# Patient Record
Sex: MALE | Race: WHITE | NOT HISPANIC OR LATINO | Employment: FULL TIME | URBAN - NONMETROPOLITAN AREA
[De-identification: names, ages, dates, MRNs, and addresses within clinical notes are randomized per-mention and may not be internally consistent; named-entity substitution may affect disease eponyms.]

---

## 2023-03-13 DIAGNOSIS — F90.2 ADHD (ATTENTION DEFICIT HYPERACTIVITY DISORDER), COMBINED TYPE: Primary | ICD-10-CM

## 2023-03-13 RX ORDER — DEXTROAMPHETAMINE SACCHARATE, AMPHETAMINE ASPARTATE MONOHYDRATE, DEXTROAMPHETAMINE SULFATE AND AMPHETAMINE SULFATE 5; 5; 5; 5 MG/1; MG/1; MG/1; MG/1
20 CAPSULE, EXTENDED RELEASE ORAL EVERY MORNING
Qty: 30 CAPSULE | Refills: 0 | Status: SHIPPED | OUTPATIENT
Start: 2023-03-13 | End: 2023-04-19 | Stop reason: SDUPTHER

## 2023-03-13 RX ORDER — DEXTROAMPHETAMINE SACCHARATE, AMPHETAMINE ASPARTATE MONOHYDRATE, DEXTROAMPHETAMINE SULFATE AND AMPHETAMINE SULFATE 5; 5; 5; 5 MG/1; MG/1; MG/1; MG/1
20 CAPSULE, EXTENDED RELEASE ORAL EVERY MORNING
COMMUNITY
Start: 2022-08-08 | End: 2023-03-13 | Stop reason: SDUPTHER

## 2023-04-19 DIAGNOSIS — F90.2 ADHD (ATTENTION DEFICIT HYPERACTIVITY DISORDER), COMBINED TYPE: ICD-10-CM

## 2023-04-19 RX ORDER — DEXTROAMPHETAMINE SACCHARATE, AMPHETAMINE ASPARTATE MONOHYDRATE, DEXTROAMPHETAMINE SULFATE AND AMPHETAMINE SULFATE 5; 5; 5; 5 MG/1; MG/1; MG/1; MG/1
20 CAPSULE, EXTENDED RELEASE ORAL EVERY MORNING
Qty: 30 CAPSULE | Refills: 0 | Status: SHIPPED | OUTPATIENT
Start: 2023-04-19 | End: 2023-04-28 | Stop reason: SDUPTHER

## 2023-04-28 DIAGNOSIS — F90.2 ADHD (ATTENTION DEFICIT HYPERACTIVITY DISORDER), COMBINED TYPE: ICD-10-CM

## 2023-04-28 RX ORDER — DEXTROAMPHETAMINE SACCHARATE, AMPHETAMINE ASPARTATE MONOHYDRATE, DEXTROAMPHETAMINE SULFATE AND AMPHETAMINE SULFATE 5; 5; 5; 5 MG/1; MG/1; MG/1; MG/1
20 CAPSULE, EXTENDED RELEASE ORAL EVERY MORNING
Qty: 30 CAPSULE | Refills: 0 | Status: SHIPPED | OUTPATIENT
Start: 2023-04-28 | End: 2023-06-09 | Stop reason: SDUPTHER

## 2023-06-09 DIAGNOSIS — F90.2 ADHD (ATTENTION DEFICIT HYPERACTIVITY DISORDER), COMBINED TYPE: ICD-10-CM

## 2023-06-09 RX ORDER — DEXTROAMPHETAMINE SACCHARATE, AMPHETAMINE ASPARTATE MONOHYDRATE, DEXTROAMPHETAMINE SULFATE AND AMPHETAMINE SULFATE 5; 5; 5; 5 MG/1; MG/1; MG/1; MG/1
20 CAPSULE, EXTENDED RELEASE ORAL EVERY MORNING
Qty: 30 CAPSULE | Refills: 0 | Status: SHIPPED | OUTPATIENT
Start: 2023-06-09 | End: 2023-07-10 | Stop reason: SDUPTHER

## 2023-06-09 NOTE — PROGRESS NOTES
Subjective   Patient ID: Andrew Valencia is a 47 y.o. male who presents for No chief complaint on file..  HPI    Review of Systems    Objective   Physical Exam    Assessment/Plan

## 2023-07-10 DIAGNOSIS — F90.2 ADHD (ATTENTION DEFICIT HYPERACTIVITY DISORDER), COMBINED TYPE: ICD-10-CM

## 2023-07-10 RX ORDER — DEXTROAMPHETAMINE SACCHARATE, AMPHETAMINE ASPARTATE MONOHYDRATE, DEXTROAMPHETAMINE SULFATE AND AMPHETAMINE SULFATE 5; 5; 5; 5 MG/1; MG/1; MG/1; MG/1
20 CAPSULE, EXTENDED RELEASE ORAL EVERY MORNING
Qty: 30 CAPSULE | Refills: 0 | Status: SHIPPED | OUTPATIENT
Start: 2023-07-10 | End: 2023-08-03 | Stop reason: SDUPTHER

## 2023-07-18 DIAGNOSIS — M1A.00X0 IDIOPATHIC CHRONIC GOUT WITHOUT TOPHUS, UNSPECIFIED SITE: Primary | ICD-10-CM

## 2023-07-18 RX ORDER — ALLOPURINOL 300 MG/1
1 TABLET ORAL DAILY
COMMUNITY
Start: 2021-07-26 | End: 2023-07-18 | Stop reason: SDUPTHER

## 2023-07-18 RX ORDER — ALLOPURINOL 300 MG/1
300 TABLET ORAL DAILY
Qty: 90 TABLET | Refills: 3 | Status: SHIPPED | OUTPATIENT
Start: 2023-07-18 | End: 2024-07-17

## 2023-08-03 ENCOUNTER — OFFICE VISIT (OUTPATIENT)
Dept: PRIMARY CARE | Facility: CLINIC | Age: 48
End: 2023-08-03
Payer: COMMERCIAL

## 2023-08-03 VITALS
OXYGEN SATURATION: 97 % | BODY MASS INDEX: 38.62 KG/M2 | DIASTOLIC BLOOD PRESSURE: 80 MMHG | HEART RATE: 92 BPM | WEIGHT: 254 LBS | SYSTOLIC BLOOD PRESSURE: 134 MMHG

## 2023-08-03 DIAGNOSIS — E78.1 HYPERTRIGLYCERIDEMIA: ICD-10-CM

## 2023-08-03 DIAGNOSIS — M10.071 ACUTE IDIOPATHIC GOUT INVOLVING TOE OF RIGHT FOOT: ICD-10-CM

## 2023-08-03 DIAGNOSIS — Z51.81 ENCOUNTER FOR MONITORING STIMULANT THERAPY: ICD-10-CM

## 2023-08-03 DIAGNOSIS — Z00.00 WELL ADULT EXAM: Primary | ICD-10-CM

## 2023-08-03 DIAGNOSIS — Z79.899 ENCOUNTER FOR MONITORING STIMULANT THERAPY: ICD-10-CM

## 2023-08-03 DIAGNOSIS — F90.2 ADHD (ATTENTION DEFICIT HYPERACTIVITY DISORDER), COMBINED TYPE: ICD-10-CM

## 2023-08-03 PROBLEM — S83.241A ACUTE MEDIAL MENISCUS TEAR OF RIGHT KNEE: Status: RESOLVED | Noted: 2023-08-03 | Resolved: 2023-08-03

## 2023-08-03 PROBLEM — M25.571 ACUTE RIGHT ANKLE PAIN: Status: RESOLVED | Noted: 2023-08-03 | Resolved: 2023-08-03

## 2023-08-03 PROBLEM — F33.1 MAJOR DEPRESSIVE DISORDER, RECURRENT EPISODE, MODERATE WITH ANXIOUS DISTRESS (MULTI): Status: ACTIVE | Noted: 2023-08-03

## 2023-08-03 PROBLEM — R03.0 ELEVATED BLOOD PRESSURE, SITUATIONAL: Status: ACTIVE | Noted: 2023-08-03

## 2023-08-03 PROBLEM — K21.9 GASTROESOPHAGEAL REFLUX DISEASE: Status: ACTIVE | Noted: 2023-08-03

## 2023-08-03 PROBLEM — R25.2 SPASM: Status: RESOLVED | Noted: 2023-08-03 | Resolved: 2023-08-03

## 2023-08-03 PROBLEM — M23.91 INTERNAL DERANGEMENT OF RIGHT KNEE: Status: RESOLVED | Noted: 2023-08-03 | Resolved: 2023-08-03

## 2023-08-03 PROBLEM — S83.411A SPRAIN OF MEDIAL COLLATERAL LIGAMENT OF RIGHT KNEE: Status: RESOLVED | Noted: 2023-08-03 | Resolved: 2023-08-03

## 2023-08-03 PROBLEM — L89.221 PRESSURE INJURY OF LEFT HIP, STAGE 1: Status: RESOLVED | Noted: 2023-08-03 | Resolved: 2023-08-03

## 2023-08-03 PROBLEM — K64.8 INTERNAL HEMORRHOIDS: Status: ACTIVE | Noted: 2023-08-03

## 2023-08-03 PROBLEM — M10.9 ACUTE GOUT INVOLVING TOE OF RIGHT FOOT: Status: ACTIVE | Noted: 2023-08-03

## 2023-08-03 PROBLEM — M25.561 ACUTE PAIN OF RIGHT KNEE: Status: RESOLVED | Noted: 2023-08-03 | Resolved: 2023-08-03

## 2023-08-03 LAB
ALANINE AMINOTRANSFERASE (SGPT) (U/L) IN SER/PLAS: 29 U/L (ref 10–52)
ALBUMIN (G/DL) IN SER/PLAS: 4.6 G/DL (ref 3.4–5)
ALKALINE PHOSPHATASE (U/L) IN SER/PLAS: 47 U/L (ref 33–120)
AMPHETAMINE (PRESENCE) IN URINE BY SCREEN METHOD: ABNORMAL
ANION GAP IN SER/PLAS: 16 MMOL/L (ref 10–20)
ASPARTATE AMINOTRANSFERASE (SGOT) (U/L) IN SER/PLAS: 20 U/L (ref 9–39)
BARBITURATES PRESENCE IN URINE BY SCREEN METHOD: ABNORMAL
BENZODIAZEPINE (PRESENCE) IN URINE BY SCREEN METHOD: ABNORMAL
BILIRUBIN TOTAL (MG/DL) IN SER/PLAS: 0.4 MG/DL (ref 0–1.2)
CALCIUM (MG/DL) IN SER/PLAS: 9.6 MG/DL (ref 8.6–10.3)
CANNABINOIDS IN URINE BY SCREEN METHOD: ABNORMAL
CARBON DIOXIDE, TOTAL (MMOL/L) IN SER/PLAS: 29 MMOL/L (ref 21–32)
CHLORIDE (MMOL/L) IN SER/PLAS: 101 MMOL/L (ref 98–107)
CHOLESTEROL (MG/DL) IN SER/PLAS: 214 MG/DL (ref 0–199)
CHOLESTEROL IN HDL (MG/DL) IN SER/PLAS: 44.7 MG/DL
CHOLESTEROL/HDL RATIO: 4.8
COCAINE (PRESENCE) IN URINE BY SCREEN METHOD: ABNORMAL
CREATININE (MG/DL) IN SER/PLAS: 1.01 MG/DL (ref 0.5–1.3)
DRUG SCREEN COMMENT URINE: ABNORMAL
FENTANYL URINE: ABNORMAL
GFR MALE: >90 ML/MIN/1.73M2
GLUCOSE (MG/DL) IN SER/PLAS: 87 MG/DL (ref 74–99)
LDL: 139 MG/DL (ref 0–99)
METHADONE (PRESENCE) IN URINE BY SCREEN METHOD: ABNORMAL
OPIATES (PRESENCE) IN URINE BY SCREEN METHOD: ABNORMAL
OXYCODONE (PRESENCE) IN URINE BY SCREEN METHOD: ABNORMAL
PHENCYCLIDINE (PRESENCE) IN URINE BY SCREEN METHOD: ABNORMAL
POTASSIUM (MMOL/L) IN SER/PLAS: 4.7 MMOL/L (ref 3.5–5.3)
PROTEIN TOTAL: 7.2 G/DL (ref 6.4–8.2)
SODIUM (MMOL/L) IN SER/PLAS: 141 MMOL/L (ref 136–145)
TRIGLYCERIDE (MG/DL) IN SER/PLAS: 154 MG/DL (ref 0–149)
UREA NITROGEN (MG/DL) IN SER/PLAS: 9 MG/DL (ref 6–23)
VLDL: 31 MG/DL (ref 0–40)

## 2023-08-03 PROCEDURE — 80053 COMPREHEN METABOLIC PANEL: CPT

## 2023-08-03 PROCEDURE — 1036F TOBACCO NON-USER: CPT | Performed by: FAMILY MEDICINE

## 2023-08-03 PROCEDURE — 99396 PREV VISIT EST AGE 40-64: CPT | Performed by: FAMILY MEDICINE

## 2023-08-03 PROCEDURE — 3079F DIAST BP 80-89 MM HG: CPT | Performed by: FAMILY MEDICINE

## 2023-08-03 PROCEDURE — 80307 DRUG TEST PRSMV CHEM ANLYZR: CPT

## 2023-08-03 PROCEDURE — 3075F SYST BP GE 130 - 139MM HG: CPT | Performed by: FAMILY MEDICINE

## 2023-08-03 PROCEDURE — 80324 DRUG SCREEN AMPHETAMINES 1/2: CPT

## 2023-08-03 PROCEDURE — 80061 LIPID PANEL: CPT

## 2023-08-03 RX ORDER — METHOCARBAMOL 500 MG/1
500 TABLET, FILM COATED ORAL 4 TIMES DAILY
COMMUNITY
End: 2024-06-08 | Stop reason: ALTCHOICE

## 2023-08-03 RX ORDER — OMEPRAZOLE 40 MG/1
40 CAPSULE, DELAYED RELEASE ORAL DAILY
COMMUNITY
End: 2023-08-18

## 2023-08-03 RX ORDER — DEXTROAMPHETAMINE SACCHARATE, AMPHETAMINE ASPARTATE MONOHYDRATE, DEXTROAMPHETAMINE SULFATE AND AMPHETAMINE SULFATE 5; 5; 5; 5 MG/1; MG/1; MG/1; MG/1
20 CAPSULE, EXTENDED RELEASE ORAL EVERY MORNING
Qty: 30 CAPSULE | Refills: 0 | Status: SHIPPED | OUTPATIENT
Start: 2023-08-03 | End: 2023-09-18 | Stop reason: SDUPTHER

## 2023-08-03 ASSESSMENT — ENCOUNTER SYMPTOMS
BLOOD IN STOOL: 0
BRUISES/BLEEDS EASILY: 0
DIARRHEA: 0
COUGH: 0
FATIGUE: 0
WEAKNESS: 0
HEMATURIA: 0
POLYDIPSIA: 0
FEVER: 0
PALPITATIONS: 0
TROUBLE SWALLOWING: 0
EYE PAIN: 0
BACK PAIN: 0
WHEEZING: 0
CONSTIPATION: 0
DIZZINESS: 0
DYSURIA: 0
ABDOMINAL PAIN: 0
CHILLS: 0
COLOR CHANGE: 0
JOINT SWELLING: 1
ADENOPATHY: 0
HEADACHES: 0
SORE THROAT: 0
ARTHRALGIAS: 1
NUMBNESS: 0
FREQUENCY: 0
SHORTNESS OF BREATH: 0
NAUSEA: 0
CHEST TIGHTNESS: 0
NERVOUS/ANXIOUS: 0
TREMORS: 0
EYE REDNESS: 0
VOMITING: 0
POLYPHAGIA: 0
DYSPHORIC MOOD: 0

## 2023-08-03 NOTE — PATIENT INSTRUCTIONS
It is important to wear your sun block when you are going to spend more then a minute or two in the sun to reduce your risk of skin cancer    If you are a woman, then you should be doing a self breast exam once a month to feel for any lumps or bumps that do not resolve during the month. Call if you find this.    If you are a man, then you should be doing a self testicular exam once a month to feel for any lumps or bumps. Call if you find this.    You should get on average 150 minutes of cardiovascular exercise (such as brisk walking, running, biking, swimming, etc.) a week.  You should also limit food high in sodium, sugar and saturated/trans fats.  Eating lots of fruits and vegetables is good at helping lower cholesterol and blood pressure if prepared correctly    The age for colonoscopy is age 45-75 for average risk individuals    Prostate screen starts at age 50 and breast cancer screening is at age 40    Woman should get a pap smear between the ages of 21 and 65. The frequency depends on your age, the type of pap you had last and the result of the last pap.    Alcohol should be limited to 1 a day for women and 2 a day for men.    No amount of tobacco in any form is safe. It is recommended that no tobacco be used in any form.  Vapes are also not safe as there are multiple harmful chemicals in them and the heat can directly damage lung tissue.

## 2023-08-03 NOTE — PROGRESS NOTES
Subjective   Patient ID: Andrew Valencia is a 47 y.o. male who presents for Annual Exam (Yearly check up, also had an issue with his left hand being a little swollen an stiff.).  HPI  Some swelling and stiffness in left MCP joint last week weeks  Started when was in [place]  Was off allopurinol and just restarted it- going away now    No CP, SOB, palpitations, numbness, weakness, dizziness, HA, vision changes    Concentration is good    OAR[facility]:  Jeff Moyer, DO on 8/3/2023  9:29 AM  I have personally reviewed the OARRS report for Andrew Valencia. I have considered the risks of abuse, dependence, addiction and diversion    Is the patient prescribed a combination of a benzodiazepine and opioid?  No    Last Urine Drug Screen / ordered today: Yes  Recent Results (from the past 09256 hour(s))   Drug Screen, Urine With Reflex to Confirmation    Collection Time: 08/03/23  9:42 AM   Result Value Ref Range    DRUG SCREEN COMMENT URINE SEE BELOW     Amphetamine Screen, Urine PRESUMPTIVE POSITIVE (A) NEGATIVE    Barbiturate Screen, Urine PRESUMPTIVE NEGATIVE NEGATIVE    BENZODIAZEPINE (PRESENCE) IN URINE BY SCREEN METHOD PRESUMPTIVE NEGATIVE NEGATIVE    Cannabinoid Screen, Urine PRESUMPTIVE NEGATIVE NEGATIVE    Cocaine Screen, Urine PRESUMPTIVE NEGATIVE NEGATIVE    Fentanyl, Ur PRESUMPTIVE NEGATIVE NEGATIVE    Methadone Screen, Urine PRESUMPTIVE NEGATIVE NEGATIVE    Opiate Screen, Urine PRESUMPTIVE NEGATIVE NEGATIVE    Oxycodone Screen, Ur PRESUMPTIVE NEGATIVE NEGATIVE    PCP Screen, Urine PRESUMPTIVE NEGATIVE NEGATIVE   Amphetamine Confirm, Urine    Collection Time: 08/08/22  2:46 PM   Result Value Ref Range    Amphetamines,Urine 751 ng/mL    MDA, Urine <200 ng/mL    MDEA, Urine <200 ng/mL    MDMA, Urine <200 ng/mL    Methamphetamine Quant, Ur <200 ng/mL    Phentermine,Urine <200 ng/mL   OPIATE/OPIOID/BENZO PRESCRIPTION COMPLIANCE    Collection Time: 08/08/22  2:46 PM   Result Value Ref Range    DRUG SCREEN  COMMENT URINE SEE BELOW     Creatine, Urine 116.0 mg/dL    Amphetamine Screen, Urine PRESUMPTIVE POSITIVE (A) NEGATIVE    Barbiturate Screen, Urine PRESUMPTIVE NEGATIVE NEGATIVE    Cannabinoid Screen, Urine PRESUMPTIVE NEGATIVE NEGATIVE    Cocaine Screen, Urine PRESUMPTIVE NEGATIVE NEGATIVE    PCP Screen, Urine PRESUMPTIVE NEGATIVE NEGATIVE    7-Aminoclonazepam <25 Cutoff <25 ng/mL    Alpha-Hydroxyalprazolam <25 Cutoff <25 ng/mL    Alpha-Hydroxymidazolam <25 Cutoff <25 ng/mL    Alprazolam <25 Cutoff <25 ng/mL    Chlordiazepoxide <25 Cutoff <25 ng/mL    Clonazepam <25 Cutoff <25 ng/mL    Diazepam <25 Cutoff <25 ng/mL    Lorazepam <25 Cutoff <25 ng/mL    Midazolam <25 Cutoff <25 ng/mL    Nordiazepam <25 Cutoff <25 ng/mL    Oxazepam <25 Cutoff <25 ng/mL    Temazepam <25 Cutoff <25 ng/mL    Zolpidem <25 Cutoff <25 ng/mL    Zolpidem Metabolite (ZCA) <25 Cutoff <25 ng/mL    6-Acetylmorphine <25 Cutoff <25 ng/mL    Codeine <50 Cutoff <50 ng/mL    Hydrocodone <25 Cutoff <25 ng/mL    Hydromorphone <25 Cutoff <25 ng/mL    Morphine Urine <50 Cutoff <50 ng/mL    Norhydrocodone <25 Cutoff <25 ng/mL    Noroxycodone <25 Cutoff <25 ng/mL    Oxycodone <25 Cutoff <25 ng/mL    Oxymorphone <25 Cutoff <25 ng/mL    Tramadol <50 Cutoff <50 ng/mL    O-Desmethyltramadol <50 Cutoff <50 ng/mL    Fentanyl <2.5 Cutoff<2.5 ng/mL    Norfentanyl <2.5 Cutoff<2.5 ng/mL    METHADONE CONFIRMATION,URINE <25 Cutoff <25 ng/mL    EDDP <25 Cutoff <25 ng/mL     Results are as expected.     Controlled Substance Agreement:  Date of the Last Agreement: 8/3/23  Reviewed Controlled Substance Agreement including but not limited to the benefits, risks, and alternatives to treatment with a Controlled Substance medication(s).    Stimulants:   What is the patient's goal of therapy? Focus at work  Is this being achieved with current treatment? Yes    Activities of Daily Living:   Is your overall impression that this patient is benefiting (symptom reduction outweighs  side effects) from stimulant therapy? Yes     1. Physical Functioning: Same  2. Family Relationship: Better  3. Social Relationship: Better  4. Mood: Better  5. Sleep Patterns: Better  6. Overall Function: Better        Current Outpatient Medications:     allopurinol (Zyloprim) 300 mg tablet, Take 1 tablet (300 mg) by mouth once daily., Disp: 90 tablet, Rfl: 3    amphetamine-dextroamphetamine XR (Adderall XR) 20 mg 24 hr capsule, Take 1 capsule (20 mg) by mouth once daily in the morning., Disp: 30 capsule, Rfl: 0    methocarbamol (Robaxin) 500 mg tablet, Take 1 tablet (500 mg) by mouth 4 times a day., Disp: , Rfl:     omeprazole (PriLOSEC) 40 mg DR capsule, Take 1 capsule (40 mg) by mouth once daily., Disp: , Rfl:    Past Surgical History:   Procedure Laterality Date    APPENDECTOMY  09/06/2017    Appendectomy      Past Medical History:   Diagnosis Date    Acute medial meniscus tear of right knee 08/03/2023    Acute pain of right knee 08/03/2023    Acute right ankle pain 08/03/2023    Cellulitis of left lower limb 11/16/2019    Cellulitis of hip, left    Cramp and spasm 09/06/2017    Hand or foot spasms    Internal derangement of right knee 08/03/2023    Other acute sinusitis 09/28/2018    Other acute sinusitis, recurrence not specified    Personal history of other diseases of the respiratory system 09/29/2018    History of acute sinusitis    Personal history of other specified conditions 03/13/2019    History of fatigue    Personal history of other specified conditions 01/25/2021    History of dyspnea    Rash and other nonspecific skin eruption 01/25/2021    Rash    Strain of muscle and tendon of unspecified wall of thorax, initial encounter 03/24/2018    Strain of thoracic region, initial encounter     Social History     Tobacco Use    Smoking status: Former     Types: Cigarettes    Smokeless tobacco: Never   Vaping Use    Vaping Use: Never used   Substance Use Topics    Alcohol use: Yes    Drug use: Never      No  family history on file.   Review of Systems   Constitutional:  Negative for chills, fatigue and fever.   HENT:  Negative for congestion, ear discharge, ear pain, hearing loss, nosebleeds, sore throat, tinnitus and trouble swallowing.    Eyes:  Negative for pain, redness and visual disturbance.   Respiratory:  Negative for cough, chest tightness, shortness of breath and wheezing.    Cardiovascular:  Negative for chest pain, palpitations and leg swelling.   Gastrointestinal:  Negative for abdominal pain, blood in stool, constipation, diarrhea, nausea and vomiting.   Endocrine: Negative for cold intolerance, heat intolerance, polydipsia, polyphagia and polyuria.   Genitourinary:  Negative for dysuria, frequency, hematuria and urgency.   Musculoskeletal:  Positive for arthralgias and joint swelling. Negative for back pain and gait problem.   Skin:  Negative for color change and rash.   Neurological:  Negative for dizziness, tremors, syncope, weakness, numbness and headaches.   Hematological:  Negative for adenopathy. Does not bruise/bleed easily.   Psychiatric/Behavioral:  Negative for dysphoric mood. The patient is not nervous/anxious.        Objective   /80 (BP Location: Right arm, Patient Position: Sitting, BP Cuff Size: Large adult)   Pulse 92   Wt 115 kg (254 lb)   SpO2 97%   BMI 38.62 kg/m²    Physical Exam  Vitals and nursing note reviewed.   Constitutional:       General: He is not in acute distress.     Appearance: Normal appearance.   HENT:      Head: Normocephalic and atraumatic.      Right Ear: Tympanic membrane, ear canal and external ear normal.      Left Ear: Tympanic membrane, ear canal and external ear normal.      Nose: Nose normal.      Mouth/Throat:      Mouth: Mucous membranes are moist.      Pharynx: Oropharynx is clear.   Eyes:      Extraocular Movements: Extraocular movements intact.      Conjunctiva/sclera: Conjunctivae normal.      Pupils: Pupils are equal, round, and reactive to  light.   Neck:      Vascular: No carotid bruit.   Cardiovascular:      Rate and Rhythm: Normal rate and regular rhythm.      Pulses: Normal pulses.      Heart sounds: Normal heart sounds. No murmur heard.  Pulmonary:      Effort: Pulmonary effort is normal.      Breath sounds: Normal breath sounds.   Abdominal:      General: Abdomen is flat. Bowel sounds are normal.      Palpations: Abdomen is soft. There is no mass.   Musculoskeletal:         General: Normal range of motion.      Cervical back: Normal range of motion and neck supple.   Lymphadenopathy:      Cervical: No cervical adenopathy.   Skin:     Capillary Refill: Capillary refill takes less than 2 seconds.   Neurological:      General: No focal deficit present.      Mental Status: He is alert and oriented to person, place, and time.      Cranial Nerves: No cranial nerve deficit.      Motor: No weakness.      Deep Tendon Reflexes: Reflexes normal.   Psychiatric:         Mood and Affect: Mood normal.         Behavior: Behavior normal.         Assessment/Plan   Problem List Items Addressed This Visit       ADHD (attention deficit hyperactivity disorder), combined type    Relevant Medications    amphetamine-dextroamphetamine XR (Adderall XR) 20 mg 24 hr capsule    Hypertriglyceridemia    Relevant Orders    Comprehensive Metabolic Panel (Completed)    Lipid Panel (Completed)    Acute gout involving toe of right foot     Other Visit Diagnoses       Well adult exam    -  Primary    Encounter for monitoring stimulant therapy        Relevant Orders    Drug Screen, Urine With Reflex to Confirmation (Completed)        ADHD- adderall, UDS    Gout- allopurinol, avoid purines    Hyperlipidemia- TLC, check labs    GERD- omeprazole, avoid trigger foods    Patient understands and agrees with treatment plan    Jeff Moyer DO

## 2023-08-07 LAB
AMPHETAMINES,URINE: 2348 NG/ML
MDA,URINE: <200 NG/ML
MDEA,URINE: <200 NG/ML
MDMA,UR: <200 NG/ML
METHAMPHETAMINE QUANTITATIVE URINE: <200 NG/ML
PHENTERMINE,UR: <200 NG/ML

## 2023-08-11 DIAGNOSIS — M10.071 ACUTE IDIOPATHIC GOUT INVOLVING TOE OF RIGHT FOOT: Primary | ICD-10-CM

## 2023-08-11 RX ORDER — COLCHICINE 0.6 MG/1
TABLET ORAL
Qty: 9 TABLET | Refills: 1 | Status: SHIPPED | OUTPATIENT
Start: 2023-08-11 | End: 2023-10-05 | Stop reason: ALTCHOICE

## 2023-08-18 DIAGNOSIS — K21.9 GASTROESOPHAGEAL REFLUX DISEASE WITHOUT ESOPHAGITIS: Primary | ICD-10-CM

## 2023-08-18 RX ORDER — OMEPRAZOLE 40 MG/1
40 CAPSULE, DELAYED RELEASE ORAL DAILY
Qty: 90 CAPSULE | Refills: 0 | Status: SHIPPED | OUTPATIENT
Start: 2023-08-18 | End: 2024-05-15

## 2023-08-28 ENCOUNTER — TELEMEDICINE (OUTPATIENT)
Dept: PRIMARY CARE | Facility: CLINIC | Age: 48
End: 2023-08-28
Payer: COMMERCIAL

## 2023-08-28 DIAGNOSIS — M10.071 ACUTE IDIOPATHIC GOUT INVOLVING TOE OF RIGHT FOOT: Primary | ICD-10-CM

## 2023-08-28 PROCEDURE — 99441 PR PHYS/QHP TELEPHONE EVALUATION 5-10 MIN: CPT | Performed by: FAMILY MEDICINE

## 2023-08-28 RX ORDER — PREDNISONE 20 MG/1
40 TABLET ORAL DAILY
Qty: 10 TABLET | Refills: 1 | Status: SHIPPED | OUTPATIENT
Start: 2023-08-28 | End: 2023-09-07 | Stop reason: WASHOUT

## 2023-08-28 NOTE — PROGRESS NOTES
Subjective   Patient ID: Andrew Valencia is a 47 y.o. male who presents for No chief complaint on file..  HPI    Current Outpatient Medications:     allopurinol (Zyloprim) 300 mg tablet, Take 1 tablet (300 mg) by mouth once daily., Disp: 90 tablet, Rfl: 3    amphetamine-dextroamphetamine XR (Adderall XR) 20 mg 24 hr capsule, Take 1 capsule (20 mg) by mouth once daily in the morning., Disp: 30 capsule, Rfl: 0    colchicine 0.6 mg tablet, Take 2 pills now and then 1 pill 12 hours later, Disp: 9 tablet, Rfl: 1    methocarbamol (Robaxin) 500 mg tablet, Take 1 tablet (500 mg) by mouth 4 times a day., Disp: , Rfl:     omeprazole (PriLOSEC) 40 mg DR capsule, TAKE ONE CAPSULE BY MOUTH EVERY DAY, Disp: 90 capsule, Rfl: 0   Past Surgical History:   Procedure Laterality Date    APPENDECTOMY  09/06/2017    Appendectomy      Past Medical History:   Diagnosis Date    Acute medial meniscus tear of right knee 08/03/2023    Acute pain of right knee 08/03/2023    Acute right ankle pain 08/03/2023    Cellulitis of left lower limb 11/16/2019    Cellulitis of hip, left    Cramp and spasm 09/06/2017    Hand or foot spasms    Internal derangement of right knee 08/03/2023    Other acute sinusitis 09/28/2018    Other acute sinusitis, recurrence not specified    Personal history of other diseases of the respiratory system 09/29/2018    History of acute sinusitis    Personal history of other specified conditions 03/13/2019    History of fatigue    Personal history of other specified conditions 01/25/2021    History of dyspnea    Rash and other nonspecific skin eruption 01/25/2021    Rash    Strain of muscle and tendon of unspecified wall of thorax, initial encounter 03/24/2018    Strain of thoracic region, initial encounter     Social History     Tobacco Use    Smoking status: Former     Types: Cigarettes    Smokeless tobacco: Never   Vaping Use    Vaping Use: Never used   Substance Use Topics    Alcohol use: Yes    Drug use: Never      No  family history on file.   Review of Systems    Objective   There were no vitals taken for this visit.   Physical Exam    Assessment/Plan   Problem List Items Addressed This Visit    None      Patient understands and agrees with treatment plan    Jeff Moyer, DO

## 2023-08-29 NOTE — PROGRESS NOTES
Subjective   Patient ID: Andrew Valencia is a 47 y.o. male who presents for Gout.  HPI  Still having a lot of gout pain even though took colchicine and is on allopurinol  Diffuse joint pain  No fever, chills  Minimal redness  Says feels like his gout pain  No help with topical rubs    Current Outpatient Medications:     allopurinol (Zyloprim) 300 mg tablet, Take 1 tablet (300 mg) by mouth once daily., Disp: 90 tablet, Rfl: 3    amphetamine-dextroamphetamine XR (Adderall XR) 20 mg 24 hr capsule, Take 1 capsule (20 mg) by mouth once daily in the morning., Disp: 30 capsule, Rfl: 0    colchicine 0.6 mg tablet, Take 2 pills now and then 1 pill 12 hours later, Disp: 9 tablet, Rfl: 1    methocarbamol (Robaxin) 500 mg tablet, Take 1 tablet (500 mg) by mouth 4 times a day., Disp: , Rfl:     omeprazole (PriLOSEC) 40 mg DR capsule, TAKE ONE CAPSULE BY MOUTH EVERY DAY, Disp: 90 capsule, Rfl: 0    predniSONE (Deltasone) 20 mg tablet, Take 2 tablets (40 mg) by mouth once daily for 10 days., Disp: 10 tablet, Rfl: 1   Past Surgical History:   Procedure Laterality Date    APPENDECTOMY  09/06/2017    Appendectomy      Past Medical History:   Diagnosis Date    Acute medial meniscus tear of right knee 08/03/2023    Acute pain of right knee 08/03/2023    Acute right ankle pain 08/03/2023    Cellulitis of left lower limb 11/16/2019    Cellulitis of hip, left    Cramp and spasm 09/06/2017    Hand or foot spasms    Internal derangement of right knee 08/03/2023    Other acute sinusitis 09/28/2018    Other acute sinusitis, recurrence not specified    Personal history of other diseases of the respiratory system 09/29/2018    History of acute sinusitis    Personal history of other specified conditions 03/13/2019    History of fatigue    Personal history of other specified conditions 01/25/2021    History of dyspnea    Rash and other nonspecific skin eruption 01/25/2021    Rash    Strain of muscle and tendon of unspecified wall of thorax,  "initial encounter 03/24/2018    Strain of thoracic region, initial encounter     Social History     Tobacco Use    Smoking status: Former     Types: Cigarettes    Smokeless tobacco: Never   Vaping Use    Vaping Use: Never used   Substance Use Topics    Alcohol use: Yes    Drug use: Never      No family history on file.   Review of Systems    Objective   There were no vitals taken for this visit.   Physical Exam    Assessment/Plan   Problem List Items Addressed This Visit       Acute gout involving toe of right foot - Primary   Prednisone 40 mg qday for 5 days  Avoid purines, fluids    I discussed with the patient the potential benefits and risks of the use of telephone or video-conferencing that differ from in-person services (e.g., limits to patient confidentiality, limitations on the provider’s ability to observe the patient, limitations on the diagnostic tools available). I explained to the patient that I may determine at any point that telehealth services are not appropriate based on the patient’s circumstances and either party may therefore end the service to schedule an alternative in-person service or contact 911 to address a medical emergency. With the understanding of these risks, benefits and alternatives, the patient agreed to use the telephone or video-conferencing platform selected for this virtual session and further the patient confirmed his/her understanding that the services do not guarantee a specific outcome or recovery.  \"Spent 5 minutes with patient on phone discussing health concerns.\"        Patient understands and agrees with treatment plan    Jeff Moyer, DO        "

## 2023-09-18 DIAGNOSIS — F90.2 ADHD (ATTENTION DEFICIT HYPERACTIVITY DISORDER), COMBINED TYPE: ICD-10-CM

## 2023-09-18 RX ORDER — DEXTROAMPHETAMINE SACCHARATE, AMPHETAMINE ASPARTATE MONOHYDRATE, DEXTROAMPHETAMINE SULFATE AND AMPHETAMINE SULFATE 5; 5; 5; 5 MG/1; MG/1; MG/1; MG/1
20 CAPSULE, EXTENDED RELEASE ORAL EVERY MORNING
Qty: 30 CAPSULE | Refills: 0 | Status: SHIPPED | OUTPATIENT
Start: 2023-09-18 | End: 2023-11-09 | Stop reason: SDUPTHER

## 2023-10-05 ENCOUNTER — OFFICE VISIT (OUTPATIENT)
Dept: PRIMARY CARE | Facility: CLINIC | Age: 48
End: 2023-10-05
Payer: COMMERCIAL

## 2023-10-05 VITALS
DIASTOLIC BLOOD PRESSURE: 88 MMHG | HEART RATE: 101 BPM | BODY MASS INDEX: 34.92 KG/M2 | OXYGEN SATURATION: 96 % | WEIGHT: 257.8 LBS | HEIGHT: 72 IN | SYSTOLIC BLOOD PRESSURE: 138 MMHG

## 2023-10-05 DIAGNOSIS — M25.50 POLYARTHRALGIA: Primary | ICD-10-CM

## 2023-10-05 LAB
CRP SERPL-MCNC: 0.57 MG/DL
ERYTHROCYTE [SEDIMENTATION RATE] IN BLOOD BY WESTERGREN METHOD: 23 MM/H (ref 0–15)
RHEUMATOID FACT SER NEPH-ACNC: 61 IU/ML (ref 0–15)

## 2023-10-05 PROCEDURE — 99213 OFFICE O/P EST LOW 20 MIN: CPT | Performed by: FAMILY MEDICINE

## 2023-10-05 PROCEDURE — 86225 DNA ANTIBODY NATIVE: CPT

## 2023-10-05 PROCEDURE — 36415 COLL VENOUS BLD VENIPUNCTURE: CPT

## 2023-10-05 PROCEDURE — 86200 CCP ANTIBODY: CPT

## 2023-10-05 PROCEDURE — 3075F SYST BP GE 130 - 139MM HG: CPT | Performed by: FAMILY MEDICINE

## 2023-10-05 PROCEDURE — 86235 NUCLEAR ANTIGEN ANTIBODY: CPT

## 2023-10-05 PROCEDURE — 86140 C-REACTIVE PROTEIN: CPT

## 2023-10-05 PROCEDURE — 86431 RHEUMATOID FACTOR QUANT: CPT

## 2023-10-05 PROCEDURE — 3079F DIAST BP 80-89 MM HG: CPT | Performed by: FAMILY MEDICINE

## 2023-10-05 PROCEDURE — 86038 ANTINUCLEAR ANTIBODIES: CPT

## 2023-10-05 PROCEDURE — 85652 RBC SED RATE AUTOMATED: CPT

## 2023-10-05 PROCEDURE — 1036F TOBACCO NON-USER: CPT | Performed by: FAMILY MEDICINE

## 2023-10-05 RX ORDER — HYDROXYCHLOROQUINE SULFATE 200 MG/1
200 TABLET, FILM COATED ORAL DAILY
Qty: 30 TABLET | Refills: 1 | Status: SHIPPED | OUTPATIENT
Start: 2023-10-05 | End: 2023-10-17 | Stop reason: SDUPTHER

## 2023-10-05 ASSESSMENT — PATIENT HEALTH QUESTIONNAIRE - PHQ9
SUM OF ALL RESPONSES TO PHQ9 QUESTIONS 1 AND 2: 0
1. LITTLE INTEREST OR PLEASURE IN DOING THINGS: NOT AT ALL
2. FEELING DOWN, DEPRESSED OR HOPELESS: NOT AT ALL

## 2023-10-05 NOTE — PROGRESS NOTES
Subjective   Patient ID: Andrew Valencia is a 47 y.o. male who presents for Joint Swelling (Pain as well).  HPI  Pain in hands, wrists and knees  Swelling in hands  Prednisone makes it go away and then comes back  Taking 4-6 aleves a day  + stiffness, soreness  No redness in joints but will get more warm  ? Some psoriasis patches  No fever, chills  No locking up or giving out    Current Outpatient Medications:     allopurinol (Zyloprim) 300 mg tablet, Take 1 tablet (300 mg) by mouth once daily., Disp: 90 tablet, Rfl: 3    amphetamine-dextroamphetamine XR (Adderall XR) 20 mg 24 hr capsule, Take 1 capsule (20 mg) by mouth once daily in the morning., Disp: 30 capsule, Rfl: 0    omeprazole (PriLOSEC) 40 mg DR capsule, TAKE ONE CAPSULE BY MOUTH EVERY DAY, Disp: 90 capsule, Rfl: 0    hydroxychloroquine (Plaquenil) 200 mg tablet, Take 1 tablet (200 mg) by mouth once daily., Disp: 30 tablet, Rfl: 1    methocarbamol (Robaxin) 500 mg tablet, Take 1 tablet (500 mg) by mouth 4 times a day., Disp: , Rfl:    Past Surgical History:   Procedure Laterality Date    APPENDECTOMY  09/06/2017    Appendectomy      Past Medical History:   Diagnosis Date    Acute medial meniscus tear of right knee 08/03/2023    Acute pain of right knee 08/03/2023    Acute right ankle pain 08/03/2023    Cellulitis of left lower limb 11/16/2019    Cellulitis of hip, left    Cramp and spasm 09/06/2017    Hand or foot spasms    Internal derangement of right knee 08/03/2023    Other acute sinusitis 09/28/2018    Other acute sinusitis, recurrence not specified    Personal history of other diseases of the respiratory system 09/29/2018    History of acute sinusitis    Personal history of other specified conditions 03/13/2019    History of fatigue    Personal history of other specified conditions 01/25/2021    History of dyspnea    Rash and other nonspecific skin eruption 01/25/2021    Rash    Strain of muscle and tendon of unspecified wall of thorax, initial  encounter 03/24/2018    Strain of thoracic region, initial encounter     Social History     Tobacco Use    Smoking status: Former     Types: Cigarettes    Smokeless tobacco: Never   Vaping Use    Vaping Use: Never used   Substance Use Topics    Alcohol use: Yes    Drug use: Never      No family history on file.   Review of Systems    Objective   /88 (BP Location: Right arm, Patient Position: Sitting, BP Cuff Size: Large adult)   Pulse 101   Ht 1.829 m (6')   Wt 117 kg (257 lb 12.8 oz)   SpO2 96%   BMI 34.96 kg/m²    Physical Exam  Vitals and nursing note reviewed.   Constitutional:       Appearance: Normal appearance.   HENT:      Head: Normocephalic and atraumatic.   Cardiovascular:      Rate and Rhythm: Normal rate and regular rhythm.      Pulses: Normal pulses.      Heart sounds: Normal heart sounds.   Pulmonary:      Effort: Pulmonary effort is normal.      Breath sounds: Normal breath sounds.   Musculoskeletal:      Cervical back: Normal range of motion and neck supple.      Comments: Slight swelling and TTP in MCP and PIP joints of hands   Skin:     Capillary Refill: Capillary refill takes less than 2 seconds.   Neurological:      General: No focal deficit present.      Mental Status: He is alert and oriented to person, place, and time.      Sensory: No sensory deficit.      Motor: No weakness.   Psychiatric:         Mood and Affect: Mood is not anxious.         Behavior: Behavior normal.       Assessment/Plan   Problem List Items Addressed This Visit    None  Visit Diagnoses       Polyarthralgia    -  Primary    Relevant Medications    hydroxychloroquine (Plaquenil) 200 mg tablet    Other Relevant Orders    [facility] with Reflex to [facility]    Rheumatoid Factor (Completed)    Sedimentation Rate (Completed)    C-Reactive Protein (Completed)    Citrulline Antibody, IgG        Suspect Psoriatic Arthritis- plaquenil (follow with eye doctor), NSAID prn, checked labs    Patient understands and agrees with treatment  plan    Jeff Moyer, DO

## 2023-10-06 LAB
ANA PATTERN: ABNORMAL
ANA SER QL HEP2 SUBST: POSITIVE
ANA TITR SER IF: ABNORMAL {TITER}
CCP IGG SERPL-ACNC: >300 U/ML
CENTROMERE B AB SER-ACNC: <0.2 AI
CHROMATIN AB SERPL-ACNC: <0.2 AI
DSDNA AB SER-ACNC: 1 IU/ML
ENA JO1 AB SER QL IA: <0.2 AI
ENA RNP AB SER IA-ACNC: 0.9 AI
ENA SCL70 AB SER QL IA: <0.2 AI
ENA SM AB SER IA-ACNC: <0.2 AI
ENA SM+RNP AB SER QL IA: <0.2 AI
ENA SS-A AB SER IA-ACNC: <0.2 AI
ENA SS-B AB SER IA-ACNC: <0.2 AI
RIBOSOMAL P AB SER-ACNC: <0.2 AI

## 2023-10-09 DIAGNOSIS — M19.90 INFLAMMATORY ARTHRITIS: Primary | ICD-10-CM

## 2023-10-15 PROBLEM — Z79.899 ON STIMULANT MEDICATION: Status: ACTIVE | Noted: 2023-10-15

## 2023-10-15 PROBLEM — E66.01 CLASS 2 SEVERE OBESITY DUE TO EXCESS CALORIES WITH SERIOUS COMORBIDITY AND BODY MASS INDEX (BMI) OF 38.0 TO 38.9 IN ADULT (MULTI): Status: ACTIVE | Noted: 2023-10-15

## 2023-10-15 PROBLEM — E66.812 CLASS 2 SEVERE OBESITY DUE TO EXCESS CALORIES WITH SERIOUS COMORBIDITY AND BODY MASS INDEX (BMI) OF 38.0 TO 38.9 IN ADULT: Status: ACTIVE | Noted: 2023-10-15

## 2023-10-15 RX ORDER — DEXTROAMPHETAMINE SACCHARATE, AMPHETAMINE ASPARTATE MONOHYDRATE, DEXTROAMPHETAMINE SULFATE AND AMPHETAMINE SULFATE 3.75; 3.75; 3.75; 3.75 MG/1; MG/1; MG/1; MG/1
15 CAPSULE, EXTENDED RELEASE ORAL EVERY MORNING
COMMUNITY
End: 2023-11-09 | Stop reason: SDUPTHER

## 2023-10-15 RX ORDER — COLCHICINE 0.6 MG/1
0.6 TABLET ORAL DAILY
COMMUNITY

## 2023-10-17 ENCOUNTER — OFFICE VISIT (OUTPATIENT)
Dept: RHEUMATOLOGY | Facility: CLINIC | Age: 48
End: 2023-10-17
Payer: COMMERCIAL

## 2023-10-17 ENCOUNTER — LAB (OUTPATIENT)
Dept: LAB | Facility: LAB | Age: 48
End: 2023-10-17
Payer: COMMERCIAL

## 2023-10-17 VITALS — DIASTOLIC BLOOD PRESSURE: 60 MMHG | BODY MASS INDEX: 34.72 KG/M2 | SYSTOLIC BLOOD PRESSURE: 138 MMHG | WEIGHT: 256 LBS

## 2023-10-17 DIAGNOSIS — M05.9 SEROPOSITIVE RHEUMATOID ARTHRITIS (MULTI): Primary | ICD-10-CM

## 2023-10-17 DIAGNOSIS — M1A.0790 IDIOPATHIC CHRONIC GOUT OF FOOT WITHOUT TOPHUS, UNSPECIFIED LATERALITY: ICD-10-CM

## 2023-10-17 DIAGNOSIS — R76.8 ANA POSITIVE: ICD-10-CM

## 2023-10-17 DIAGNOSIS — M05.9 SEROPOSITIVE RHEUMATOID ARTHRITIS (MULTI): ICD-10-CM

## 2023-10-17 DIAGNOSIS — M25.50 POLYARTHRALGIA: ICD-10-CM

## 2023-10-17 LAB
C3 SERPL-MCNC: 161 MG/DL (ref 87–200)
C4 SERPL-MCNC: 24 MG/DL (ref 10–50)
HBV CORE AB SER QL: NONREACTIVE
HBV SURFACE AG SERPL QL IA: NONREACTIVE
HCV AB SER QL: NONREACTIVE
URATE SERPL-MCNC: 4.2 MG/DL (ref 4–7.5)

## 2023-10-17 PROCEDURE — 36415 COLL VENOUS BLD VENIPUNCTURE: CPT

## 2023-10-17 PROCEDURE — 1036F TOBACCO NON-USER: CPT | Performed by: INTERNAL MEDICINE

## 2023-10-17 PROCEDURE — 3078F DIAST BP <80 MM HG: CPT | Performed by: INTERNAL MEDICINE

## 2023-10-17 PROCEDURE — 86160 COMPLEMENT ANTIGEN: CPT

## 2023-10-17 PROCEDURE — 86481 TB AG RESPONSE T-CELL SUSP: CPT

## 2023-10-17 PROCEDURE — 84550 ASSAY OF BLOOD/URIC ACID: CPT

## 2023-10-17 PROCEDURE — 86803 HEPATITIS C AB TEST: CPT

## 2023-10-17 PROCEDURE — 3075F SYST BP GE 130 - 139MM HG: CPT | Performed by: INTERNAL MEDICINE

## 2023-10-17 PROCEDURE — 87340 HEPATITIS B SURFACE AG IA: CPT

## 2023-10-17 PROCEDURE — 99204 OFFICE O/P NEW MOD 45 MIN: CPT | Performed by: INTERNAL MEDICINE

## 2023-10-17 PROCEDURE — 86704 HEP B CORE ANTIBODY TOTAL: CPT

## 2023-10-17 RX ORDER — HYDROXYCHLOROQUINE SULFATE 200 MG/1
200 TABLET, FILM COATED ORAL 2 TIMES DAILY
Qty: 120 TABLET | Refills: 1 | Status: SHIPPED | OUTPATIENT
Start: 2023-10-17 | End: 2023-12-18 | Stop reason: SDUPTHER

## 2023-10-17 RX ORDER — FOLIC ACID 1 MG/1
1 TABLET ORAL DAILY
Qty: 90 TABLET | Refills: 1 | Status: SHIPPED | OUTPATIENT
Start: 2023-10-17 | End: 2023-12-18 | Stop reason: SDUPTHER

## 2023-10-17 RX ORDER — METHOTREXATE 2.5 MG/1
15 TABLET ORAL
Qty: 24 TABLET | Refills: 2 | Status: SHIPPED | OUTPATIENT
Start: 2023-10-17 | End: 2023-11-16

## 2023-10-17 NOTE — PROGRESS NOTES
"Subjective   Patient ID: Andrew Valencia is a 47 y.o. male referred by primary care physician Dr. Jeff Moyer for rheumatoid arthritis evaluation and management.      HPI.  47-year-old male with history of gout, GERD, class II obesity, ADHD, major depression presents with worsening joint pain.  He stated that he was diagnosed with gout in his feet 2 years ago and started taking allopurinol regularly.  However in July 2023 he noted pain in his feet, hands, elbows, shoulders and knees with swelling of the hands.  Pain is persistent and worse in the hands and needs in the morning up to 8/10.  He states gout medication is not working anymore.  He noticed significant pain relief when he took prednisone.    He states that he has psoriasis since childhood and it flares at times involving the elbows and knees.  Currently he has no psoriatic rash.  Both parents has rheumatoid arthritis.    Labs obtained couple months ago showed RF 61, CCP>300, CHRIS at 1: 320 with negative HAMMAD.  ESR 23 and CRP 53.    Review of Systems   All other systems reviewed and are negative.  Objective       8/8/2022     2:15 PM 8/8/2022     2:50 PM 10/5/2022    11:10 AM 8/3/2023     9:09 AM 10/5/2023     9:20 AM 10/5/2023     9:35 AM 10/17/2023     2:50 PM   Vitals   Systolic 142 130 142 134 160 138 138   Diastolic 80 76 94 80 80 88 60   Heart Rate 92  90 92 101     Height (in) 1.727 m (5' 8\")  1.727 m (5' 8\")  1.829 m (6')     Weight (lb) 250  249 254 257.8  256   BMI 38.01 kg/m2  37.86 kg/m2 38.62 kg/m2 34.96 kg/m2  34.72 kg/m2   BSA (m2) 2.33 m2  2.33 m2 2.35 m2 2.44 m2  2.43 m2   Visit Report    Report Report Report Report      Physical Exam.  Gen. AAO x3, NAD.  HEENT: No pallor or icterus, PERRLA, EOMI. Oropharynx is clear. MM moist,Parotid glands  not enlarged. No cervical lymphadenopathy .  Skin: No rashes.  Heart: S1, S2/ RRR. No murmurs or gallops.  Lungs: CTA B.  Abdomen: Soft, NT/ND, BS regular.  MSK: Right 2, 3 MCP with swelling and " moderate to severe tenderness.  Right  Middle finger PIP with tenderness upon squeeze.  Right wrist without swelling and tenderness.  Left 2, 3 and 5 MCP with tenderness.  Left 2, 3 PIP with tenderness.  Left wrist with swelling and tenderness.  Bilateral elbow and shoulders without swelling and tenderness.  Neck, spine and SI joint without tenderness.  Bilateral Obdulio's negative.  Bilateral knee without swelling and tenderness.  Bilateral ankle and MTPs without swelling or tenderness.    Neuro: CN II-XII intact. Sensation to touch intact.Strength 5/5 throughout. DTR 2+ and symmetrical.  Psych:Appropriate mood and behavior  EXT: No edema    Assessment/Plan   47-year-old male with history of gout, GERD, class II obesity, ADHD, major depression presents with polyarthritis.    #1: Seropositive RA.  Positive CHRIS could be secondary to RA.  Do not think he has gout flare or psoriatic arthritis.  -Patient was counseled regarding the pathophysiology of available medical treatments and outcomes in length.  -Begin methotrexate 15 mg/week.  Side effect discussed in detail.  -Begin folic acid 1 mg daily.  -Increase hydroxychloroquine to 200 mg twice a day.  -Begin prednisone 10 mg daily for now.  -Labs    Follow-up in 8 weeks.     This note was partially generated using the Dragon Voice recognition system. There may be some incorrect wording, spelling and/or spelling errors or punctuation errors that were not corrected prior to committing the note to the medical record.    Patient Active Problem List   Diagnosis    ADHD (attention deficit hyperactivity disorder), combined type    Elevated blood pressure, situational    Gastroesophageal reflux disease    Hypertriglyceridemia    Acute gout involving toe of right foot    Internal hemorrhoids    Major depressive disorder, recurrent episode, moderate with anxious distress (CMS/HCC)    Class 2 severe obesity due to excess calories with serious comorbidity and body mass index (BMI)  of 38.0 to 38.9 in adult (CMS/[facility])    On stimulant medication      Past Surgical History:   Procedure Laterality Date    APPENDECTOMY  09/06/2017    Appendectomy      Social History     Tobacco Use    Smoking status: Former     Types: Cigarettes    Smokeless tobacco: Never   Substance Use Topics    Alcohol use: Yes      No family history on file.   Allergies   Allergen Reactions    Bupropion Other    Escitalopram Other    Paroxetine Other      Current Outpatient Medications   Medication Instructions    allopurinol (ZYLOPRIM) 300 mg, oral, Daily    amphetamine-dextroamphetamine XR (Adderall XR) 15 mg 24 hr capsule 15 mg, oral, Every morning    amphetamine-dextroamphetamine XR (Adderall XR) 20 mg 24 hr capsule 20 mg, oral, Every morning    colchicine (gout) 0.6 mg, oral, Daily, As directed as starting allopurinol     folic acid (FOLVITE) 1 mg, oral, Daily    hydroxychloroquine (PLAQUENIL) 200 mg, oral, 2 times daily    methocarbamol (ROBAXIN) 500 mg, oral, 4 times daily    methotrexate (TREXALL) 15 mg, oral, Weekly, Follow directions carefully, and ask to explain any part you do not understand. Take exactly as directed.    omeprazole (PRILOSEC) 40 mg, oral, Daily    predniSONE 10 mg, oral, Daily

## 2023-10-17 NOTE — PATIENT INSTRUCTIONS
Take methotrexate 6 pills once a week.  Take hydroxychloroquine 2 pills daily.  Take prednisone 2 pills daily.  Take folic acid 1 pill daily.  Blood work today.  Call me if any question.  Follow-up in 8 weeks.

## 2023-10-17 NOTE — LETTER
October 17, 2023     Jeff Moyer DO  [id number] [facility] [id number]    Patient: Andrew Valencia   YOB: 1975   Date of Visit: 10/17/2023       Dear Dr. Jeff Moyer DO:    Thank you for referring Andrew Valencia to me for evaluation. Below are my notes for this consultation.  If you have questions, please do not hesitate to call me. I look forward to following your patient along with you.       Sincerely,     Edmund Barraza MD      CC: No Recipients  ______________________________________________________________________________________    Subjective  Patient ID: Andrew Valencia is a 47 y.o. male referred by primary care physician Dr. Jeff Moyer for rheumatoid arthritis evaluation and management.      HPI.  47-year-old male with history of gout, GERD, class II obesity, ADHD, major depression presents with worsening joint pain.  He stated that he was diagnosed with gout in his feet 2 years ago and started taking allopurinol regularly.  However in July 2023 he noted pain in his feet, hands, elbows, shoulders and knees with swelling of the hands.  Pain is persistent and worse in the hands and needs in the morning up to 8/10.  He states gout medication is not working anymore.  He noticed significant pain relief when he took prednisone.    He states that he has psoriasis since childhood and it flares at times involving the elbows and knees.  Currently he has no psoriatic rash.  Both parents has rheumatoid arthritis.    Labs obtained couple months ago showed RF 61, CCP>300, CHRIS at 1: 320 with negative HAMMAD.  ESR 23 and CRP 53.    Review of Systems   All other systems reviewed and are negative.  Objective      8/8/2022     2:15 PM 8/8/2022     2:50 PM 10/5/2022    11:10 AM 8/3/2023     9:09 AM 10/5/2023     9:20 AM 10/5/2023     9:35 AM 10/17/2023     2:50 PM   Vitals   Systolic 142 130 142 134 160 138 138   Diastolic 80 76 94 80 80 88 60   Heart Rate 92  90 92 101     Height (in)  "1.727 m (5' 8\")  1.727 m (5' 8\")  1.829 m (6')     Weight (lb) 250  249 254 257.8  256   BMI 38.01 kg/m2  37.86 kg/m2 38.62 kg/m2 34.96 kg/m2  34.72 kg/m2   BSA (m2) 2.33 m2  2.33 m2 2.35 m2 2.44 m2  2.43 m2   Visit Report    Report Report Report Report      Physical Exam.  Gen. AAO x3, NAD.  HEENT: No pallor or icterus, PERRLA, EOMI. Oropharynx is clear. MM moist,Parotid glands  not enlarged. No cervical lymphadenopathy .  Skin: No rashes.  Heart: S1, S2/ RRR. No murmurs or gallops.  Lungs: CTA B.  Abdomen: Soft, NT/ND, BS regular.  MSK: Right 2, 3 MCP with swelling and moderate to severe tenderness.  Right  Middle finger PIP with tenderness upon squeeze.  Right wrist without swelling and tenderness.  Left 2, 3 and 5 MCP with tenderness.  Left 2, 3 PIP with tenderness.  Left wrist with swelling and tenderness.  Bilateral elbow and shoulders without swelling and tenderness.  Neck, spine and SI joint without tenderness.  Bilateral Obdulio's negative.  Bilateral knee without swelling and tenderness.  Bilateral ankle and MTPs without swelling or tenderness.    Neuro: CN II-XII intact. Sensation to touch intact.Strength 5/5 throughout. DTR 2+ and symmetrical.  Psych:Appropriate mood and behavior  EXT: No edema    Assessment/Plan  47-year-old male with history of gout, GERD, class II obesity, ADHD, major depression presents with polyarthritis.    #1: Seropositive RA.  Positive CHRIS could be secondary to RA.  Do not think he has gout flare or psoriatic arthritis.  -Patient was counseled regarding the pathophysiology of available medical treatments and outcomes in length.  -Begin methotrexate 15 mg/week.  Side effect discussed in detail.  -Begin folic acid 1 mg daily.  -Increase hydroxychloroquine to 200 mg twice a day.  -Begin prednisone 10 mg daily for now.  -Labs    Follow-up in 8 weeks.     This note was partially generated using the Dragon Voice recognition system. There may be some incorrect wording, spelling and/or " spelling errors or punctuation errors that were not corrected prior to committing the note to the medical record.    Patient Active Problem List   Diagnosis   • ADHD (attention deficit hyperactivity disorder), combined type   • Elevated blood pressure, situational   • Gastroesophageal reflux disease   • Hypertriglyceridemia   • Acute gout involving toe of right foot   • Internal hemorrhoids   • Major depressive disorder, recurrent episode, moderate with anxious distress (CMS/[facility])   • Class 2 severe obesity due to excess calories with serious comorbidity and body mass index (BMI) of 38.0 to 38.9 in adult (CMS/[facility])   • On stimulant medication      Past Surgical History:   Procedure Laterality Date   • APPENDECTOMY  09/06/2017    Appendectomy      Social History     Tobacco Use   • Smoking status: Former     Types: Cigarettes   • Smokeless tobacco: Never   Substance Use Topics   • Alcohol use: Yes      No family history on file.   Allergies   Allergen Reactions   • Bupropion Other   • Escitalopram Other   • Paroxetine Other      Current Outpatient Medications   Medication Instructions   • allopurinol (ZYLOPRIM) 300 mg, oral, Daily   • amphetamine-dextroamphetamine XR (Adderall XR) 15 mg 24 hr capsule 15 mg, oral, Every morning   • amphetamine-dextroamphetamine XR (Adderall XR) 20 mg 24 hr capsule 20 mg, oral, Every morning   • colchicine (gout) 0.6 mg, oral, Daily, As directed as starting allopurinol    • folic acid (FOLVITE) 1 mg, oral, Daily   • hydroxychloroquine (PLAQUENIL) 200 mg, oral, 2 times daily   • methocarbamol (ROBAXIN) 500 mg, oral, 4 times daily   • methotrexate (TREXALL) 15 mg, oral, Weekly, Follow directions carefully, and ask to explain any part you do not understand. Take exactly as directed.   • omeprazole (PRILOSEC) 40 mg, oral, Daily   • predniSONE 10 mg, oral, Daily

## 2023-10-19 LAB
NIL(NEG) CONTROL SPOT COUNT: NORMAL
PANEL A SPOT COUNT: 0
PANEL B SPOT COUNT: 0
POS CONTROL SPOT COUNT: NORMAL
T-SPOT. TB INTERPRETATION: NEGATIVE

## 2023-10-30 DIAGNOSIS — N52.9 VASCULOGENIC ERECTILE DYSFUNCTION, UNSPECIFIED VASCULOGENIC ERECTILE DYSFUNCTION TYPE: Primary | ICD-10-CM

## 2023-10-30 RX ORDER — SILDENAFIL 100 MG/1
100 TABLET, FILM COATED ORAL AS NEEDED
Qty: 9 TABLET | Refills: 11 | Status: SHIPPED | OUTPATIENT
Start: 2023-10-30 | End: 2024-10-29

## 2023-11-09 DIAGNOSIS — F90.2 ADHD (ATTENTION DEFICIT HYPERACTIVITY DISORDER), COMBINED TYPE: ICD-10-CM

## 2023-11-09 RX ORDER — DEXTROAMPHETAMINE SACCHARATE, AMPHETAMINE ASPARTATE MONOHYDRATE, DEXTROAMPHETAMINE SULFATE AND AMPHETAMINE SULFATE 5; 5; 5; 5 MG/1; MG/1; MG/1; MG/1
20 CAPSULE, EXTENDED RELEASE ORAL EVERY MORNING
Qty: 30 CAPSULE | Refills: 0 | Status: SHIPPED | OUTPATIENT
Start: 2023-11-09 | End: 2023-12-22 | Stop reason: SDUPTHER

## 2023-11-09 RX ORDER — DEXTROAMPHETAMINE SACCHARATE, AMPHETAMINE ASPARTATE MONOHYDRATE, DEXTROAMPHETAMINE SULFATE AND AMPHETAMINE SULFATE 3.75; 3.75; 3.75; 3.75 MG/1; MG/1; MG/1; MG/1
15 CAPSULE, EXTENDED RELEASE ORAL EVERY MORNING
Qty: 30 CAPSULE | Refills: 0 | Status: SHIPPED | OUTPATIENT
Start: 2023-11-09 | End: 2023-11-10 | Stop reason: DRUGHIGH

## 2023-12-18 ENCOUNTER — LAB (OUTPATIENT)
Dept: LAB | Facility: LAB | Age: 48
End: 2023-12-18
Payer: COMMERCIAL

## 2023-12-18 ENCOUNTER — OFFICE VISIT (OUTPATIENT)
Dept: RHEUMATOLOGY | Facility: CLINIC | Age: 48
End: 2023-12-18
Payer: COMMERCIAL

## 2023-12-18 VITALS — BODY MASS INDEX: 35.26 KG/M2 | WEIGHT: 260 LBS | DIASTOLIC BLOOD PRESSURE: 90 MMHG | SYSTOLIC BLOOD PRESSURE: 136 MMHG

## 2023-12-18 DIAGNOSIS — M05.9 SEROPOSITIVE RHEUMATOID ARTHRITIS (MULTI): Primary | ICD-10-CM

## 2023-12-18 DIAGNOSIS — M25.50 POLYARTHRALGIA: ICD-10-CM

## 2023-12-18 DIAGNOSIS — M05.9 SEROPOSITIVE RHEUMATOID ARTHRITIS (MULTI): ICD-10-CM

## 2023-12-18 LAB
ALBUMIN SERPL BCP-MCNC: 4.8 G/DL (ref 3.4–5)
ALP SERPL-CCNC: 43 U/L (ref 33–120)
ALT SERPL W P-5'-P-CCNC: 27 U/L (ref 10–52)
ANION GAP SERPL CALC-SCNC: 14 MMOL/L (ref 10–20)
AST SERPL W P-5'-P-CCNC: 17 U/L (ref 9–39)
BILIRUB SERPL-MCNC: 0.7 MG/DL (ref 0–1.2)
BUN SERPL-MCNC: 9 MG/DL (ref 6–23)
CALCIUM SERPL-MCNC: 10.3 MG/DL (ref 8.6–10.6)
CHLORIDE SERPL-SCNC: 101 MMOL/L (ref 98–107)
CO2 SERPL-SCNC: 30 MMOL/L (ref 21–32)
CREAT SERPL-MCNC: 0.96 MG/DL (ref 0.5–1.3)
CRP SERPL-MCNC: 0.18 MG/DL
ERYTHROCYTE [DISTWIDTH] IN BLOOD BY AUTOMATED COUNT: 13.6 % (ref 11.5–14.5)
ERYTHROCYTE [SEDIMENTATION RATE] IN BLOOD BY WESTERGREN METHOD: 7 MM/H (ref 0–15)
GFR SERPL CREATININE-BSD FRML MDRD: >90 ML/MIN/1.73M*2
GLUCOSE SERPL-MCNC: 96 MG/DL (ref 74–99)
HCT VFR BLD AUTO: 42.5 % (ref 41–52)
HGB BLD-MCNC: 13.7 G/DL (ref 13.5–17.5)
MCH RBC QN AUTO: 29.9 PG (ref 26–34)
MCHC RBC AUTO-ENTMCNC: 32.2 G/DL (ref 32–36)
MCV RBC AUTO: 93 FL (ref 80–100)
NRBC BLD-RTO: 0 /100 WBCS (ref 0–0)
PLATELET # BLD AUTO: 230 X10*3/UL (ref 150–450)
POTASSIUM SERPL-SCNC: 4.1 MMOL/L (ref 3.5–5.3)
PROT SERPL-MCNC: 7.1 G/DL (ref 6.4–8.2)
RBC # BLD AUTO: 4.58 X10*6/UL (ref 4.5–5.9)
SODIUM SERPL-SCNC: 141 MMOL/L (ref 136–145)
WBC # BLD AUTO: 6.2 X10*3/UL (ref 4.4–11.3)

## 2023-12-18 PROCEDURE — 1036F TOBACCO NON-USER: CPT | Performed by: INTERNAL MEDICINE

## 2023-12-18 PROCEDURE — 99213 OFFICE O/P EST LOW 20 MIN: CPT | Performed by: INTERNAL MEDICINE

## 2023-12-18 PROCEDURE — 3080F DIAST BP >= 90 MM HG: CPT | Performed by: INTERNAL MEDICINE

## 2023-12-18 PROCEDURE — 36415 COLL VENOUS BLD VENIPUNCTURE: CPT

## 2023-12-18 PROCEDURE — 3075F SYST BP GE 130 - 139MM HG: CPT | Performed by: INTERNAL MEDICINE

## 2023-12-18 PROCEDURE — 85652 RBC SED RATE AUTOMATED: CPT

## 2023-12-18 PROCEDURE — 85027 COMPLETE CBC AUTOMATED: CPT

## 2023-12-18 PROCEDURE — 86140 C-REACTIVE PROTEIN: CPT

## 2023-12-18 PROCEDURE — 80053 COMPREHEN METABOLIC PANEL: CPT

## 2023-12-18 RX ORDER — HYDROXYCHLOROQUINE SULFATE 200 MG/1
200 TABLET, FILM COATED ORAL 2 TIMES DAILY
Qty: 180 TABLET | Refills: 0 | Status: SHIPPED | OUTPATIENT
Start: 2023-12-18 | End: 2024-04-05 | Stop reason: SDUPTHER

## 2023-12-18 RX ORDER — FOLIC ACID 1 MG/1
1 TABLET ORAL DAILY
Qty: 90 TABLET | Refills: 0 | Status: SHIPPED | OUTPATIENT
Start: 2023-12-18 | End: 2024-03-17

## 2023-12-18 RX ORDER — METHOTREXATE 2.5 MG/1
15 TABLET ORAL
Qty: 72 TABLET | Refills: 0 | Status: SHIPPED | OUTPATIENT
Start: 2023-12-18 | End: 2024-03-18 | Stop reason: SDUPTHER

## 2023-12-18 NOTE — PATIENT INSTRUCTIONS
Take hydroxychloroquine and methotrexate as prescribed.  Take prednisone as needed.  Schedule eye exam.  Call me if any question.  Follow-up in 3 months.

## 2023-12-18 NOTE — PROGRESS NOTES
"Subjective   Patient ID: Andrew Valencia is a 48 y.o. male presented for follow-up.  Arthritis    47-year-old male with history of seropositive RA, positive CHRIS, gout, GERD, class II obesity, ADHD, major depression presented for follow-up.    He states that he is feeling much better however still feels soreness and stiffness in the hands.  He has not noticed swelling of the knuckles.      Immunosuppression: HCQ (10/2023), methotrexate (10/2023) and prednisone.    Labs obtained couple months ago showed RF 61, CCP>300, CHRIS at 1: 320 with negative HAMMAD.  ESR 23 and CRP 53.    Review of Systems   Musculoskeletal:  Positive for arthritis.   All other systems reviewed and are negative.  Objective       8/8/2022     2:50 PM 10/5/2022    11:10 AM 8/3/2023     9:09 AM 10/5/2023     9:20 AM 10/5/2023     9:35 AM 10/17/2023     2:50 PM 12/18/2023     9:38 AM   Vitals   Systolic 130 142 134 160 138 138 136   Diastolic 76 94 80 80 88 60 90   Heart Rate  90 92 101      Height (in)  1.727 m (5' 8\")  1.829 m (6')      Weight (lb)  249 254 257.8  256 260   BMI  37.86 kg/m2 38.62 kg/m2 34.96 kg/m2  34.72 kg/m2 35.26 kg/m2   BSA (m2)  2.33 m2 2.35 m2 2.44 m2  2.43 m2 2.45 m2   Visit Report   Report Report Report Report Report      Physical Exam.  Gen. AAO x3, NAD.  HEENT: No pallor or icterus, PERRLA, EOMI. No cervical lymphadenopathy .  Skin: No rashes.  Heart: S1, S2/ RRR.   Lungs: CTA B.  Abdomen: Soft, NT/ND.  MSK: No erythema, swelling or tenderness of the MCP joints.  Bilateral ankle and MTPs without swelling and tenderness.    Neuro:  Sensation to touch intact.Strength 5/5 throughout.   Psych:Appropriate mood and behavior  EXT: No edema    Assessment/Plan   47-year-old male with history of seropositive RA, positive CHRIS, gout, GERD, class II obesity, ADHD, major depression presents with polyarthritis.    #1: Seropositive RA.  He is doing better.  -Continue methotrexate 15 mg/week.    -Continue folic acid 1 mg daily.  -Continue " hydroxychloroquine to 200 mg twice a day.  Eye exam discussed.  -Take prednisone 10 mg as needed  -Labs    Follow-up in 3 months.     This note was partially generated using the Dragon Voice recognition system. There may be some incorrect wording, spelling and/or spelling errors or punctuation errors that were not corrected prior to committing the note to the medical record.    Patient Active Problem List   Diagnosis    ADHD (attention deficit hyperactivity disorder), combined type    Elevated blood pressure, situational    Gastroesophageal reflux disease    Hypertriglyceridemia    Acute gout involving toe of right foot    Internal hemorrhoids    Major depressive disorder, recurrent episode, moderate with anxious distress (CMS/[facility])    Class 2 severe obesity due to excess calories with serious comorbidity and body mass index (BMI) of 38.0 to 38.9 in adult (CMS/[facility])    On stimulant medication      Past Surgical History:   Procedure Laterality Date    APPENDECTOMY  09/06/2017    Appendectomy      Social History     Tobacco Use    Smoking status: Former     Types: Cigarettes    Smokeless tobacco: Never   Substance Use Topics    Alcohol use: Yes      No family history on file.   Allergies   Allergen Reactions    Bupropion Other    Escitalopram Other    Paroxetine Other      Current Outpatient Medications   Medication Instructions    allopurinol (ZYLOPRIM) 300 mg, oral, Daily    amphetamine-dextroamphetamine XR (Adderall XR) 20 mg 24 hr capsule 20 mg, oral, Every morning    colchicine 0.6 mg, oral, Daily, As directed as starting allopurinol     folic acid (FOLVITE) 1 mg, oral, Daily    hydroxychloroquine (PLAQUENIL) 200 mg, oral, 2 times daily    methocarbamol (ROBAXIN) 500 mg, oral, 4 times daily    methotrexate (TREXALL) 15 mg, oral, Weekly, Follow directions carefully, and ask to explain any part you do not understand. Take exactly as directed.    omeprazole (PRILOSEC) 40 mg, oral, Daily    predniSONE 10 mg, oral,  Daily    sildenafil (VIAGRA) 100 mg, oral, As needed

## 2023-12-22 DIAGNOSIS — F90.2 ADHD (ATTENTION DEFICIT HYPERACTIVITY DISORDER), COMBINED TYPE: ICD-10-CM

## 2023-12-22 RX ORDER — DEXTROAMPHETAMINE SACCHARATE, AMPHETAMINE ASPARTATE MONOHYDRATE, DEXTROAMPHETAMINE SULFATE AND AMPHETAMINE SULFATE 5; 5; 5; 5 MG/1; MG/1; MG/1; MG/1
20 CAPSULE, EXTENDED RELEASE ORAL EVERY MORNING
Qty: 30 CAPSULE | Refills: 0 | Status: SHIPPED | OUTPATIENT
Start: 2023-12-22 | End: 2024-01-31 | Stop reason: SDUPTHER

## 2024-01-31 DIAGNOSIS — F90.2 ADHD (ATTENTION DEFICIT HYPERACTIVITY DISORDER), COMBINED TYPE: ICD-10-CM

## 2024-01-31 RX ORDER — DEXTROAMPHETAMINE SACCHARATE, AMPHETAMINE ASPARTATE MONOHYDRATE, DEXTROAMPHETAMINE SULFATE AND AMPHETAMINE SULFATE 5; 5; 5; 5 MG/1; MG/1; MG/1; MG/1
20 CAPSULE, EXTENDED RELEASE ORAL EVERY MORNING
Qty: 30 CAPSULE | Refills: 0 | Status: SHIPPED | OUTPATIENT
Start: 2024-01-31 | End: 2024-03-07 | Stop reason: SDUPTHER

## 2024-03-07 DIAGNOSIS — F90.2 ADHD (ATTENTION DEFICIT HYPERACTIVITY DISORDER), COMBINED TYPE: ICD-10-CM

## 2024-03-07 RX ORDER — DEXTROAMPHETAMINE SACCHARATE, AMPHETAMINE ASPARTATE MONOHYDRATE, DEXTROAMPHETAMINE SULFATE AND AMPHETAMINE SULFATE 5; 5; 5; 5 MG/1; MG/1; MG/1; MG/1
20 CAPSULE, EXTENDED RELEASE ORAL EVERY MORNING
Qty: 30 CAPSULE | Refills: 0 | Status: SHIPPED | OUTPATIENT
Start: 2024-03-07 | End: 2024-03-08 | Stop reason: SDUPTHER

## 2024-03-08 DIAGNOSIS — F90.2 ADHD (ATTENTION DEFICIT HYPERACTIVITY DISORDER), COMBINED TYPE: ICD-10-CM

## 2024-03-08 RX ORDER — DEXTROAMPHETAMINE SACCHARATE, AMPHETAMINE ASPARTATE MONOHYDRATE, DEXTROAMPHETAMINE SULFATE AND AMPHETAMINE SULFATE 5; 5; 5; 5 MG/1; MG/1; MG/1; MG/1
20 CAPSULE, EXTENDED RELEASE ORAL EVERY MORNING
Qty: 30 CAPSULE | Refills: 0 | Status: SHIPPED | OUTPATIENT
Start: 2024-03-08 | End: 2024-04-11 | Stop reason: SDUPTHER

## 2024-03-18 ENCOUNTER — LAB (OUTPATIENT)
Dept: LAB | Facility: LAB | Age: 49
End: 2024-03-18
Payer: COMMERCIAL

## 2024-03-18 ENCOUNTER — OFFICE VISIT (OUTPATIENT)
Dept: RHEUMATOLOGY | Facility: CLINIC | Age: 49
End: 2024-03-18
Payer: COMMERCIAL

## 2024-03-18 VITALS — SYSTOLIC BLOOD PRESSURE: 158 MMHG | BODY MASS INDEX: 37.16 KG/M2 | WEIGHT: 274 LBS | DIASTOLIC BLOOD PRESSURE: 101 MMHG

## 2024-03-18 DIAGNOSIS — M1A.0790 IDIOPATHIC CHRONIC GOUT OF FOOT WITHOUT TOPHUS, UNSPECIFIED LATERALITY: ICD-10-CM

## 2024-03-18 DIAGNOSIS — M05.9 SEROPOSITIVE RHEUMATOID ARTHRITIS (MULTI): Primary | ICD-10-CM

## 2024-03-18 DIAGNOSIS — M05.9 SEROPOSITIVE RHEUMATOID ARTHRITIS (MULTI): ICD-10-CM

## 2024-03-18 LAB
ALBUMIN SERPL BCP-MCNC: 4.5 G/DL (ref 3.4–5)
ALP SERPL-CCNC: 47 U/L (ref 33–120)
ALT SERPL W P-5'-P-CCNC: 28 U/L (ref 10–52)
ANION GAP SERPL CALC-SCNC: 13 MMOL/L (ref 10–20)
AST SERPL W P-5'-P-CCNC: 18 U/L (ref 9–39)
BILIRUB SERPL-MCNC: 0.3 MG/DL (ref 0–1.2)
BUN SERPL-MCNC: 9 MG/DL (ref 6–23)
CALCIUM SERPL-MCNC: 9.8 MG/DL (ref 8.6–10.6)
CHLORIDE SERPL-SCNC: 103 MMOL/L (ref 98–107)
CO2 SERPL-SCNC: 30 MMOL/L (ref 21–32)
CREAT SERPL-MCNC: 0.78 MG/DL (ref 0.5–1.3)
CRP SERPL-MCNC: 0.18 MG/DL
EGFRCR SERPLBLD CKD-EPI 2021: >90 ML/MIN/1.73M*2
ERYTHROCYTE [DISTWIDTH] IN BLOOD BY AUTOMATED COUNT: 13.4 % (ref 11.5–14.5)
ERYTHROCYTE [SEDIMENTATION RATE] IN BLOOD BY WESTERGREN METHOD: 10 MM/H (ref 0–15)
GLUCOSE SERPL-MCNC: 101 MG/DL (ref 74–99)
HCT VFR BLD AUTO: 46.5 % (ref 41–52)
HGB BLD-MCNC: 14.3 G/DL (ref 13.5–17.5)
MCH RBC QN AUTO: 30.2 PG (ref 26–34)
MCHC RBC AUTO-ENTMCNC: 30.8 G/DL (ref 32–36)
MCV RBC AUTO: 98 FL (ref 80–100)
NRBC BLD-RTO: 0 /100 WBCS (ref 0–0)
PLATELET # BLD AUTO: 228 X10*3/UL (ref 150–450)
POTASSIUM SERPL-SCNC: 4.8 MMOL/L (ref 3.5–5.3)
PROT SERPL-MCNC: 7.1 G/DL (ref 6.4–8.2)
RBC # BLD AUTO: 4.74 X10*6/UL (ref 4.5–5.9)
SODIUM SERPL-SCNC: 141 MMOL/L (ref 136–145)
WBC # BLD AUTO: 4.6 X10*3/UL (ref 4.4–11.3)

## 2024-03-18 PROCEDURE — 3077F SYST BP >= 140 MM HG: CPT | Performed by: INTERNAL MEDICINE

## 2024-03-18 PROCEDURE — 36415 COLL VENOUS BLD VENIPUNCTURE: CPT

## 2024-03-18 PROCEDURE — 85027 COMPLETE CBC AUTOMATED: CPT

## 2024-03-18 PROCEDURE — 85652 RBC SED RATE AUTOMATED: CPT

## 2024-03-18 PROCEDURE — 1036F TOBACCO NON-USER: CPT | Performed by: INTERNAL MEDICINE

## 2024-03-18 PROCEDURE — 86140 C-REACTIVE PROTEIN: CPT

## 2024-03-18 PROCEDURE — 3080F DIAST BP >= 90 MM HG: CPT | Performed by: INTERNAL MEDICINE

## 2024-03-18 PROCEDURE — 80053 COMPREHEN METABOLIC PANEL: CPT

## 2024-03-18 PROCEDURE — 99213 OFFICE O/P EST LOW 20 MIN: CPT | Performed by: INTERNAL MEDICINE

## 2024-03-18 RX ORDER — METHOTREXATE 2.5 MG/1
20 TABLET ORAL
Qty: 96 TABLET | Refills: 0 | Status: SHIPPED | OUTPATIENT
Start: 2024-03-18 | End: 2024-06-16

## 2024-03-18 RX ORDER — PREDNISONE 5 MG/1
10 TABLET ORAL DAILY
Qty: 180 TABLET | Refills: 0 | Status: SHIPPED | OUTPATIENT
Start: 2024-03-18 | End: 2024-06-16

## 2024-03-18 NOTE — PATIENT INSTRUCTIONS
Begin Humira injection once approved.  Take methotrexate 8 pills/week.  Continue hydroxychloroquine 1 pill twice a day.  Take prednisone as discussed.  Call if any question.  Follow-up in 3 months.

## 2024-03-18 NOTE — PROGRESS NOTES
Subjective  . Andrew Valencia is a 48 y.o. male who presents for Follow-up.    HPI . 48-year-old male with history of seropositive RA, positive CHRIS, gout, GERD, class II obesity, ADHD, major depression presented for follow-up.    Stopped prednisone about 1 and half months.  He reports constant pain in hands, wrist ankles.  He also notes intermittent pain in his shoulders and hips.  He rates hand pain up to 5/10.  He notes several hours of morning stiffness.  He notes swelling of the knuckles.     Immunosuppression: HCQ (10/2023), methotrexate (10/2023)        Review of Systems   All other systems reviewed and are negative.    Objective  . Blood pressure (!) 158/101, weight 124 kg (274 lb).    Physical Exam.  Gen. AAO x3, NAD.  HEENT: No pallor or icterus, PERRLA, EOMI. Parotid glands  not enlarged. No cervical lymphadenopathy .  Skin: No rashes.  Heart: S1, S2/ RRR.   Lungs: CTA B.  Abdomen: Soft, NT/ND, BS regular.  MSK: Right 1-3 MCP with  swelling and tenderness.  Left 2-3 and 5 MCP tenderness.  Left 2-3 PIP tenderness.  Handgrip fair.  Bilateral wrist without swelling and tenderness.  Bilateral elbow without swelling and tenderness.  Bilateral shoulder with good range of motion.  Neck, spine and SI joint without tenderness.  Bilateral knee without swelling and tenderness.  Bilateral ankle without swelling and tenderness.   Neuro: Sensation to touch intact.Strength 5/5 throughout.   Psych:Appropriate mood and behavior  EXT: No edema      Assessment/Plan  . 48-year-old male with history of seropositive RA, positive CHRIS, gout, GERD, class II obesity, ADHD, major depression presented for follow-up.    1: Seropositive RA.  Moderate to severely active.  -Begin Humira/adalimumab injection every other week.  -Increase methotrexate to 20 mg/week.  -Continue hydroxychloroquine twice a day.  -Resume prednisone 10 mg daily for now.  -Labs.    Follow-up in 3 months.     This note was partially generated using the Dragon  Voice recognition system. There may be some incorrect wording, spelling and/or spelling errors or punctuation errors that were not corrected prior to committing the note to the medical record.    Problem List Items Addressed This Visit    None  Visit Diagnoses       Seropositive rheumatoid arthritis (CMS/[facility])    -  Primary    Relevant Medications    methotrexate (Trexall) 2.5 mg tablet    adalimumab (Humira Pen) 40 mg/0.4 mL pen injector kit pen-injector    predniSONE (Deltasone) 5 mg tablet    Other Relevant Orders    CBC    Comprehensive Metabolic Panel    C-reactive protein    Sedimentation Rate                 Active Ambulatory Problems     Diagnosis Date Noted    ADHD (attention deficit hyperactivity disorder), combined type 08/03/2023    Elevated blood pressure, situational 08/03/2023    Gastroesophageal reflux disease 08/03/2023    Hypertriglyceridemia 08/03/2023    Acute gout involving toe of right foot 08/03/2023    Internal hemorrhoids 08/03/2023    Major depressive disorder, recurrent episode, moderate with anxious distress (CMS/[facility]) 08/03/2023    Class 2 severe obesity due to excess calories with serious comorbidity and body mass index (BMI) of 38.0 to 38.9 in adult (CMS/[facility]) 10/15/2023    On stimulant medication 10/15/2023     Resolved Ambulatory Problems     Diagnosis Date Noted    Acute medial meniscus tear of right knee 08/03/2023    Acute pain of right knee 08/03/2023    Acute right ankle pain 08/03/2023    Internal derangement of right knee 08/03/2023    Sprain of medial collateral ligament of right knee 08/03/2023    Pressure injury of left hip, stage 1 08/03/2023    Spasm 08/03/2023     Past Medical History:   Diagnosis Date    Cellulitis of left lower limb 11/16/2019    Cramp and spasm 09/06/2017    Other acute sinusitis 09/28/2018    Personal history of other diseases of the respiratory system 09/29/2018    Personal history of other specified conditions 03/13/2019    Personal history of other  specified conditions 01/25/2021    Rash and other nonspecific skin eruption 01/25/2021    Strain of muscle and tendon of unspecified wall of thorax, initial encounter 03/24/2018       No family history on file.    Past Surgical History:   Procedure Laterality Date    APPENDECTOMY  09/06/2017    Appendectomy       Social History     Tobacco Use   Smoking Status Former    Types: Cigarettes   Smokeless Tobacco Never       Allergies  Bupropion, Escitalopram, and Paroxetine    Current Meds  Current Outpatient Medications   Medication Instructions    adalimumab (HUMIRA PEN) 40 mg, subcutaneous, Weekly    allopurinol (ZYLOPRIM) 300 mg, oral, Daily    amphetamine-dextroamphetamine XR (Adderall XR) 20 mg 24 hr capsule 20 mg, oral, Every morning    colchicine 0.6 mg, oral, Daily, As directed as starting allopurinol     folic acid (FOLVITE) 1 mg, oral, Daily    hydroxychloroquine (PLAQUENIL) 200 mg, oral, 2 times daily    methocarbamol (ROBAXIN) 500 mg, oral, 4 times daily    methotrexate (TREXALL) 20 mg, oral, Weekly, Follow directions carefully, and ask to explain any part you do not understand. Take exactly as directed.    omeprazole (PRILOSEC) 40 mg, oral, Daily    predniSONE (DELTASONE) 10 mg, oral, Daily    sildenafil (VIAGRA) 100 mg, oral, As needed        Lab Results   Component Value Date    ANATITER 1:320 10/05/2023    C3 161 10/17/2023    RF 61 (H) 10/05/2023    SEDRATE 7 12/18/2023    CRP 0.18 12/18/2023    URICACID 4.2 10/17/2023    DNADS 1.0 10/05/2023             Edmund Barraza MD

## 2024-03-19 ENCOUNTER — SPECIALTY PHARMACY (OUTPATIENT)
Dept: PHARMACY | Facility: CLINIC | Age: 49
End: 2024-03-19

## 2024-03-25 DIAGNOSIS — M05.9 SEROPOSITIVE RHEUMATOID ARTHRITIS (MULTI): ICD-10-CM

## 2024-04-05 DIAGNOSIS — M25.50 POLYARTHRALGIA: ICD-10-CM

## 2024-04-05 RX ORDER — HYDROXYCHLOROQUINE SULFATE 200 MG/1
200 TABLET, FILM COATED ORAL 2 TIMES DAILY
Qty: 180 TABLET | Refills: 0 | Status: SHIPPED | OUTPATIENT
Start: 2024-04-05 | End: 2024-07-04

## 2024-04-11 DIAGNOSIS — F90.2 ADHD (ATTENTION DEFICIT HYPERACTIVITY DISORDER), COMBINED TYPE: ICD-10-CM

## 2024-04-11 RX ORDER — DEXTROAMPHETAMINE SACCHARATE, AMPHETAMINE ASPARTATE MONOHYDRATE, DEXTROAMPHETAMINE SULFATE AND AMPHETAMINE SULFATE 5; 5; 5; 5 MG/1; MG/1; MG/1; MG/1
20 CAPSULE, EXTENDED RELEASE ORAL EVERY MORNING
Qty: 30 CAPSULE | Refills: 0 | Status: SHIPPED | OUTPATIENT
Start: 2024-04-11 | End: 2024-05-13 | Stop reason: SDUPTHER

## 2024-04-26 ENCOUNTER — OFFICE VISIT (OUTPATIENT)
Dept: PRIMARY CARE | Facility: CLINIC | Age: 49
End: 2024-04-26
Payer: COMMERCIAL

## 2024-04-26 VITALS
OXYGEN SATURATION: 97 % | SYSTOLIC BLOOD PRESSURE: 142 MMHG | HEART RATE: 102 BPM | HEIGHT: 72 IN | DIASTOLIC BLOOD PRESSURE: 80 MMHG | WEIGHT: 271 LBS | BODY MASS INDEX: 36.7 KG/M2

## 2024-04-26 DIAGNOSIS — L40.50 PSORIATIC ARTHRITIS (MULTI): ICD-10-CM

## 2024-04-26 DIAGNOSIS — K21.9 GASTROESOPHAGEAL REFLUX DISEASE WITHOUT ESOPHAGITIS: ICD-10-CM

## 2024-04-26 DIAGNOSIS — F33.1 MAJOR DEPRESSIVE DISORDER, RECURRENT EPISODE, MODERATE WITH ANXIOUS DISTRESS (MULTI): ICD-10-CM

## 2024-04-26 DIAGNOSIS — E66.01 CLASS 2 SEVERE OBESITY DUE TO EXCESS CALORIES WITH SERIOUS COMORBIDITY AND BODY MASS INDEX (BMI) OF 36.0 TO 36.9 IN ADULT (MULTI): ICD-10-CM

## 2024-04-26 DIAGNOSIS — E78.1 HYPERTRIGLYCERIDEMIA: ICD-10-CM

## 2024-04-26 DIAGNOSIS — Z12.11 SCREENING FOR COLORECTAL CANCER: ICD-10-CM

## 2024-04-26 DIAGNOSIS — Z12.12 SCREENING FOR COLORECTAL CANCER: ICD-10-CM

## 2024-04-26 DIAGNOSIS — M06.09 RHEUMATOID ARTHRITIS OF MULTIPLE SITES WITH NEGATIVE RHEUMATOID FACTOR (MULTI): ICD-10-CM

## 2024-04-26 DIAGNOSIS — Z00.00 WELL ADULT EXAM: Primary | ICD-10-CM

## 2024-04-26 LAB
CHOLEST SERPL-MCNC: 230 MG/DL (ref 0–199)
CHOLESTEROL/HDL RATIO: 2.4
HDLC SERPL-MCNC: 96.2 MG/DL
LDLC SERPL CALC-MCNC: 84 MG/DL
NON HDL CHOLESTEROL: 134 MG/DL (ref 0–149)
TRIGL SERPL-MCNC: 248 MG/DL (ref 0–149)
TSH SERPL-ACNC: 1.41 MIU/L (ref 0.44–3.98)
VLDL: 50 MG/DL (ref 0–40)

## 2024-04-26 PROCEDURE — 36415 COLL VENOUS BLD VENIPUNCTURE: CPT

## 2024-04-26 PROCEDURE — 99396 PREV VISIT EST AGE 40-64: CPT | Performed by: FAMILY MEDICINE

## 2024-04-26 PROCEDURE — 80061 LIPID PANEL: CPT

## 2024-04-26 PROCEDURE — 3079F DIAST BP 80-89 MM HG: CPT | Performed by: FAMILY MEDICINE

## 2024-04-26 PROCEDURE — 84443 ASSAY THYROID STIM HORMONE: CPT

## 2024-04-26 PROCEDURE — 3077F SYST BP >= 140 MM HG: CPT | Performed by: FAMILY MEDICINE

## 2024-04-26 PROCEDURE — 3008F BODY MASS INDEX DOCD: CPT | Performed by: FAMILY MEDICINE

## 2024-04-26 RX ORDER — METHYLPREDNISOLONE 4 MG/1
4 TABLET ORAL DAILY
COMMUNITY
Start: 2020-07-31 | End: 2024-04-26 | Stop reason: ALTCHOICE

## 2024-04-26 NOTE — PROGRESS NOTES
Subjective   Patient ID: Andrew Valencia is a 48 y.o. male who presents for Annual Exam (Check up, medication follow up ).  HPI  No SE meds  Humira seems to help with the pain and joint swelling    Focus is okay- feel fatigue a lot since on all the Rheumatologic meds  No CP, SOB, palpitations, numbness, weakness  No dizziness, HA, vision changes  GERD is doing well    Ophtho- 2/24  Dentist- goes once a year  Colonoscopy-  CARROL-  Cologuard- ordered  PSA-  UA/Micro-  Lung CT-  Coronary Calcium CT Score-  AAA-  EKG-  RSV-   Prevnar-  Flu-  Shingrix-  Td- 4/18  Hep C-  Advance Directives-      OARRS:  Jeff Moyer, DO on 4/26/2024  3:36 PM  I have personally reviewed the OARRS report for Andrew Valencia. I have considered the risks of abuse, dependence, addiction and diversion and I believe that it is clinically appropriate for Andrew Valencia to be prescribed this medication    Is the patient prescribed a combination of a benzodiazepine and opioid?  No    Last Urine Drug Screen / ordered today: No  Recent Results (from the past 8760 hour(s))   Amphetamine Confirm, Urine    Collection Time: 08/03/23  9:42 AM   Result Value Ref Range    Methamphetamine Quant, Ur <200 ng/mL    MDA, Urine <200 ng/mL    MDEA, Urine <200 ng/mL    Phentermine,Urine <200 ng/mL    Amphetamines,Urine 2348 ng/mL    MDMA, Urine <200 ng/mL   Drug Screen, Urine With Reflex to Confirmation    Collection Time: 08/03/23  9:42 AM   Result Value Ref Range    DRUG SCREEN COMMENT URINE SEE BELOW     Amphetamine Screen, Urine PRESUMPTIVE POSITIVE (A) NEGATIVE    Barbiturate Screen, Urine PRESUMPTIVE NEGATIVE NEGATIVE    BENZODIAZEPINE (PRESENCE) IN URINE BY SCREEN METHOD PRESUMPTIVE NEGATIVE NEGATIVE    Cannabinoid Screen, Urine PRESUMPTIVE NEGATIVE NEGATIVE    Cocaine Screen, Urine PRESUMPTIVE NEGATIVE NEGATIVE    Fentanyl, Ur PRESUMPTIVE NEGATIVE NEGATIVE    Methadone Screen, Urine PRESUMPTIVE NEGATIVE NEGATIVE    Opiate Screen, Urine PRESUMPTIVE  NEGATIVE NEGATIVE    Oxycodone Screen, Ur PRESUMPTIVE NEGATIVE NEGATIVE    PCP Screen, Urine PRESUMPTIVE NEGATIVE NEGATIVE     Results are as expected.         Controlled Substance Agreement:  Date of the Last Agreement: 8/3/23  Reviewed Controlled Substance Agreement including but not limited to the benefits, risks, and alternatives to treatment with a Controlled Substance medication(s).    Stimulants:   What is the patient's goal of therapy? Better focus at work  Is this being achieved with current treatment? Yes    Activities of Daily Living:   Is your overall impression that this patient is benefiting (symptom reduction outweighs side effects) from stimulant therapy? Yes     1. Physical Functioning: Same  2. Family Relationship: Better  3. Social Relationship: Better  4. Mood: Same  5. Sleep Patterns: Same  6. Overall Function: Better      Current Outpatient Medications:     adalimumab (Humira Pen) 40 mg/0.4 mL pen injector kit pen-injector, Inject 1 Pen (40 mg) under the skin every 14 (fourteen) days., Disp: 2 each, Rfl: 2    allopurinol (Zyloprim) 300 mg tablet, Take 1 tablet (300 mg) by mouth once daily., Disp: 90 tablet, Rfl: 3    amphetamine-dextroamphetamine XR (Adderall XR) 20 mg 24 hr capsule, Take 1 capsule (20 mg) by mouth once daily in the morning., Disp: 30 capsule, Rfl: 0    colchicine, gout, 0.6 mg tablet, Take 1 tablet (0.6 mg) by mouth once daily. As directed as starting allopurinol, Disp: , Rfl:     folic acid (Folvite) 1 mg tablet, Take 1 tablet (1 mg) by mouth once daily., Disp: 90 tablet, Rfl: 0    hydroxychloroquine (Plaquenil) 200 mg tablet, Take 1 tablet (200 mg) by mouth 2 times a day., Disp: 180 tablet, Rfl: 0    methocarbamol (Robaxin) 500 mg tablet, Take 1 tablet (500 mg) by mouth 4 times a day., Disp: , Rfl:     methotrexate (Trexall) 2.5 mg tablet, Take 8 tablets (20 mg total) by mouth 1 (one) time per week.  Follow directions carefully, and ask to explain any part you do not  understand. Take exactly as directed., Disp: 96 tablet, Rfl: 0    omeprazole (PriLOSEC) 40 mg DR capsule, TAKE ONE CAPSULE BY MOUTH EVERY DAY, Disp: 90 capsule, Rfl: 0    predniSONE (Deltasone) 5 mg tablet, Take 2 tablets (10 mg) by mouth once daily., Disp: 180 tablet, Rfl: 0    sildenafil (Viagra) 100 mg tablet, Take 1 tablet (100 mg) by mouth if needed for erectile dysfunction., Disp: 9 tablet, Rfl: 11   Past Surgical History:   Procedure Laterality Date    APPENDECTOMY  09/06/2017    Appendectomy      Past Medical History:   Diagnosis Date    Acute medial meniscus tear of right knee 08/03/2023    Acute pain of right knee 08/03/2023    Acute right ankle pain 08/03/2023    Cellulitis of left lower limb 11/16/2019    Cellulitis of hip, left    Cramp and spasm 09/06/2017    Hand or foot spasms    Internal derangement of right knee 08/03/2023    Other acute sinusitis 09/28/2018    Other acute sinusitis, recurrence not specified    Personal history of other diseases of the respiratory system 09/29/2018    History of acute sinusitis    Personal history of other specified conditions 03/13/2019    History of fatigue    Personal history of other specified conditions 01/25/2021    History of dyspnea    Rash and other nonspecific skin eruption 01/25/2021    Rash    Strain of muscle and tendon of unspecified wall of thorax, initial encounter 03/24/2018    Strain of thoracic region, initial encounter     Social History     Tobacco Use    Smoking status: Former     Types: Cigarettes    Smokeless tobacco: Never   Vaping Use    Vaping status: Never Used   Substance Use Topics    Alcohol use: Yes    Drug use: Never      No family history on file.   Review of Systems   Constitutional:  Positive for fatigue. Negative for chills and fever.   HENT:  Negative for congestion, ear discharge, ear pain, hearing loss, nosebleeds, sore throat, tinnitus and trouble swallowing.    Eyes:  Negative for pain, redness and visual disturbance.    Respiratory:  Negative for cough, chest tightness, shortness of breath and wheezing.    Cardiovascular:  Negative for chest pain, palpitations and leg swelling.   Gastrointestinal:  Negative for abdominal pain, blood in stool, constipation, diarrhea, nausea and vomiting.   Endocrine: Negative for cold intolerance, heat intolerance, polydipsia, polyphagia and polyuria.   Genitourinary:  Negative for dysuria, frequency, hematuria and urgency.   Musculoskeletal:  Positive for arthralgias and joint swelling. Negative for back pain and gait problem.   Skin:  Negative for color change and rash.   Neurological:  Negative for dizziness, tremors, syncope, weakness, numbness and headaches.   Hematological:  Negative for adenopathy. Does not bruise/bleed easily.   Psychiatric/Behavioral:  Negative for dysphoric mood. The patient is not nervous/anxious.        Objective   /80   Pulse 102   Ht 1.829 m (6')   Wt 123 kg (271 lb)   SpO2 97%   BMI 36.75 kg/m²    Physical Exam  Vitals and nursing note reviewed.   Constitutional:       General: He is not in acute distress.     Appearance: Normal appearance.   HENT:      Head: Normocephalic and atraumatic.      Right Ear: Tympanic membrane, ear canal and external ear normal.      Left Ear: Tympanic membrane, ear canal and external ear normal.      Nose: Nose normal.      Mouth/Throat:      Mouth: Mucous membranes are moist.      Pharynx: Oropharynx is clear.   Eyes:      Extraocular Movements: Extraocular movements intact.      Conjunctiva/sclera: Conjunctivae normal.      Pupils: Pupils are equal, round, and reactive to light.   Neck:      Vascular: No carotid bruit.   Cardiovascular:      Rate and Rhythm: Normal rate and regular rhythm.      Pulses: Normal pulses.      Heart sounds: Normal heart sounds. No murmur heard.  Pulmonary:      Effort: Pulmonary effort is normal.      Breath sounds: Normal breath sounds.   Abdominal:      General: Abdomen is flat. Bowel sounds  are normal.      Palpations: Abdomen is soft. There is no mass.   Musculoskeletal:         General: Normal range of motion.      Cervical back: Normal range of motion and neck supple.   Lymphadenopathy:      Cervical: No cervical adenopathy.   Skin:     Capillary Refill: Capillary refill takes less than 2 seconds.   Neurological:      General: No focal deficit present.      Mental Status: He is alert and oriented to person, place, and time.      Cranial Nerves: No cranial nerve deficit.      Motor: No weakness.      Deep Tendon Reflexes: Reflexes normal.   Psychiatric:         Mood and Affect: Mood normal.         Behavior: Behavior normal.         Assessment/Plan   Problem List Items Addressed This Visit       Rheumatoid arthritis of multiple sites with negative rheumatoid factor (Multi)    Psoriatic arthritis (Multi)    Major depressive disorder, recurrent episode, moderate with anxious distress (Multi)    Hypertriglyceridemia    Relevant Orders    TSH with reflex to Free T4 if abnormal (Completed)    Lipid Panel (Completed)    Gastroesophageal reflux disease    Class 2 severe obesity due to excess calories with serious comorbidity and body mass index (BMI) of 36.0 to 36.9 in adult (Multi)     Other Visit Diagnoses       Well adult exam    -  Primary    Screening for colorectal cancer        Relevant Orders    Cologuard® colon cancer screening        ADHD- adderall, CV exercise     Gout- allopurinol, avoid purines     Hyperlipidemia- TLC, check labs    MDD-  CV exercise, does not want meds at this time    Obesity- limit calories, increase CV exercise    RA- continue meds per rheumatology     GERD- omeprazole, avoid trigger foods    Patient understands and agrees with treatment plan    Jeff Moyer DO     Patient was identified as a fall risk. Risk prevention instructions provided.

## 2024-04-28 ASSESSMENT — ENCOUNTER SYMPTOMS
VOMITING: 0
NAUSEA: 0
HEADACHES: 0
POLYPHAGIA: 0
COLOR CHANGE: 0
PALPITATIONS: 0
DYSPHORIC MOOD: 0
POLYDIPSIA: 0
FREQUENCY: 0
BLOOD IN STOOL: 0
HEMATURIA: 0
BRUISES/BLEEDS EASILY: 0
ABDOMINAL PAIN: 0
COUGH: 0
BACK PAIN: 0
FATIGUE: 1
NERVOUS/ANXIOUS: 0
DIARRHEA: 0
EYE REDNESS: 0
WHEEZING: 0
DIZZINESS: 0
ADENOPATHY: 0
CHILLS: 0
SHORTNESS OF BREATH: 0
TROUBLE SWALLOWING: 0
DYSURIA: 0
FEVER: 0
ARTHRALGIAS: 1
CHEST TIGHTNESS: 0
SORE THROAT: 0
TREMORS: 0
JOINT SWELLING: 1
NUMBNESS: 0
WEAKNESS: 0
EYE PAIN: 0
CONSTIPATION: 0

## 2024-05-13 DIAGNOSIS — F90.2 ADHD (ATTENTION DEFICIT HYPERACTIVITY DISORDER), COMBINED TYPE: ICD-10-CM

## 2024-05-13 RX ORDER — DEXTROAMPHETAMINE SACCHARATE, AMPHETAMINE ASPARTATE MONOHYDRATE, DEXTROAMPHETAMINE SULFATE AND AMPHETAMINE SULFATE 5; 5; 5; 5 MG/1; MG/1; MG/1; MG/1
20 CAPSULE, EXTENDED RELEASE ORAL EVERY MORNING
Qty: 30 CAPSULE | Refills: 0 | Status: SHIPPED | OUTPATIENT
Start: 2024-05-13

## 2024-05-15 DIAGNOSIS — K21.9 GASTROESOPHAGEAL REFLUX DISEASE WITHOUT ESOPHAGITIS: ICD-10-CM

## 2024-05-15 RX ORDER — OMEPRAZOLE 40 MG/1
40 CAPSULE, DELAYED RELEASE ORAL DAILY
Qty: 90 CAPSULE | Refills: 0 | Status: SHIPPED | OUTPATIENT
Start: 2024-05-15

## 2024-05-21 LAB — NONINV COLON CA DNA+OCC BLD SCRN STL QL: NEGATIVE

## 2024-06-08 ENCOUNTER — TELEMEDICINE (OUTPATIENT)
Dept: PRIMARY CARE | Facility: CLINIC | Age: 49
End: 2024-06-08
Payer: COMMERCIAL

## 2024-06-08 DIAGNOSIS — R41.3 MEMORY LOSS: Primary | ICD-10-CM

## 2024-06-08 DIAGNOSIS — R41.3 MEMORY LOSS OR IMPAIRMENT: Primary | ICD-10-CM

## 2024-06-08 DIAGNOSIS — F90.2 ADHD (ATTENTION DEFICIT HYPERACTIVITY DISORDER), COMBINED TYPE: ICD-10-CM

## 2024-06-08 PROCEDURE — 1036F TOBACCO NON-USER: CPT | Performed by: FAMILY MEDICINE

## 2024-06-08 PROCEDURE — 3008F BODY MASS INDEX DOCD: CPT | Performed by: FAMILY MEDICINE

## 2024-06-08 PROCEDURE — 99442 PR PHYS/QHP TELEPHONE EVALUATION 11-20 MIN: CPT | Performed by: FAMILY MEDICINE

## 2024-06-08 NOTE — PROGRESS NOTES
Subjective   Patient ID: Andrew Valencia is a 48 y.o. male who presents for Memory Loss.  HPI  Having issues with getting forgetful  Worried that it is his medications  Seems to having issues with short term  Long term okay  Has stopped drinking mostly  GF had dementia later in life       Current Outpatient Medications:     adalimumab (Humira Pen) 40 mg/0.4 mL pen injector kit pen-injector, Inject 1 Pen (40 mg) under the skin every 14 (fourteen) days., Disp: 2 each, Rfl: 2    allopurinol (Zyloprim) 300 mg tablet, Take 1 tablet (300 mg) by mouth once daily., Disp: 90 tablet, Rfl: 3    amphetamine-dextroamphetamine XR (Adderall XR) 20 mg 24 hr capsule, Take 1 capsule (20 mg) by mouth once daily in the morning., Disp: 30 capsule, Rfl: 0    colchicine, gout, 0.6 mg tablet, Take 1 tablet (0.6 mg) by mouth once daily. As directed as starting allopurinol, Disp: , Rfl:     folic acid (Folvite) 1 mg tablet, Take 1 tablet (1 mg) by mouth once daily., Disp: 90 tablet, Rfl: 0    hydroxychloroquine (Plaquenil) 200 mg tablet, Take 1 tablet (200 mg) by mouth 2 times a day., Disp: 180 tablet, Rfl: 0    methotrexate (Trexall) 2.5 mg tablet, Take 8 tablets (20 mg total) by mouth 1 (one) time per week.  Follow directions carefully, and ask to explain any part you do not understand. Take exactly as directed., Disp: 96 tablet, Rfl: 0    omeprazole (PriLOSEC) 40 mg DR capsule, Take 1 capsule by mouth once daily, Disp: 90 capsule, Rfl: 0    predniSONE (Deltasone) 5 mg tablet, Take 2 tablets (10 mg) by mouth once daily., Disp: 180 tablet, Rfl: 0    sildenafil (Viagra) 100 mg tablet, Take 1 tablet (100 mg) by mouth if needed for erectile dysfunction., Disp: 9 tablet, Rfl: 11   Past Surgical History:   Procedure Laterality Date    APPENDECTOMY  09/06/2017    Appendectomy      Past Medical History:   Diagnosis Date    Acute medial meniscus tear of right knee 08/03/2023    Acute pain of right knee 08/03/2023    Acute right ankle pain  08/03/2023    Cellulitis of left lower limb 11/16/2019    Cellulitis of hip, left    Cramp and spasm 09/06/2017    Hand or foot spasms    Internal derangement of right knee 08/03/2023    Other acute sinusitis 09/28/2018    Other acute sinusitis, recurrence not specified    Personal history of other diseases of the respiratory system 09/29/2018    History of acute sinusitis    Personal history of other specified conditions 03/13/2019    History of fatigue    Personal history of other specified conditions 01/25/2021    History of dyspnea    Rash and other nonspecific skin eruption 01/25/2021    Rash    Strain of muscle and tendon of unspecified wall of thorax, initial encounter 03/24/2018    Strain of thoracic region, initial encounter     Social History     Tobacco Use    Smoking status: Former     Types: Cigarettes    Smokeless tobacco: Never   Vaping Use    Vaping status: Never Used   Substance Use Topics    Alcohol use: Yes    Drug use: Never      No family history on file.   Review of Systems    Objective   There were no vitals taken for this visit.   Physical Exam    Assessment/Plan   Problem List Items Addressed This Visit    None  Visit Diagnoses       Memory loss    -  Primary        Check RPR, HIV, folate, vitamin B12, CMP  TSH recently checked  Check MRI brain w/o contrast  Check sleep study    I discussed with the patient the potential benefits and risks of the use of telephone or video-conferencing that differ from in-person services (e.g., limits to patient confidentiality, limitations on the provider’s ability to observe the patient, limitations on the diagnostic tools available). I explained to the patient that I may determine at any point that telehealth services are not appropriate based on the patient’s circumstances and either party may therefore end the service to schedule an alternative in-person service or contact 911 to address a medical emergency. With the understanding of these risks,  "benefits and alternatives, the patient agreed to use the telephone or video-conferencing platform selected for this virtual session and further the patient confirmed his/her understanding that the services do not guarantee a specific outcome or recovery.  \"Spent 12 minutes with patient on phone discussing health concerns.\"     Patient understands and agrees with treatment plan    Jeff Moyer, DO   "

## 2024-06-08 NOTE — PROGRESS NOTES
Subjective   Patient ID: Andrew Valencia is a 48 y.o. male who presents for No chief complaint on file..  HPI  Having issues with getting forgetful  Worried that it is his medications  Seems to having issues with short term  Long term okay  Has stopped drinking mostly  GF had dementia later in life    Current Outpatient Medications:     adalimumab (Humira Pen) 40 mg/0.4 mL pen injector kit pen-injector, Inject 1 Pen (40 mg) under the skin every 14 (fourteen) days., Disp: 2 each, Rfl: 2    allopurinol (Zyloprim) 300 mg tablet, Take 1 tablet (300 mg) by mouth once daily., Disp: 90 tablet, Rfl: 3    amphetamine-dextroamphetamine XR (Adderall XR) 20 mg 24 hr capsule, Take 1 capsule (20 mg) by mouth once daily in the morning., Disp: 30 capsule, Rfl: 0    colchicine, gout, 0.6 mg tablet, Take 1 tablet (0.6 mg) by mouth once daily. As directed as starting allopurinol, Disp: , Rfl:     folic acid (Folvite) 1 mg tablet, Take 1 tablet (1 mg) by mouth once daily., Disp: 90 tablet, Rfl: 0    hydroxychloroquine (Plaquenil) 200 mg tablet, Take 1 tablet (200 mg) by mouth 2 times a day., Disp: 180 tablet, Rfl: 0    methotrexate (Trexall) 2.5 mg tablet, Take 8 tablets (20 mg total) by mouth 1 (one) time per week.  Follow directions carefully, and ask to explain any part you do not understand. Take exactly as directed., Disp: 96 tablet, Rfl: 0    omeprazole (PriLOSEC) 40 mg DR capsule, Take 1 capsule by mouth once daily, Disp: 90 capsule, Rfl: 0    predniSONE (Deltasone) 5 mg tablet, Take 2 tablets (10 mg) by mouth once daily., Disp: 180 tablet, Rfl: 0    sildenafil (Viagra) 100 mg tablet, Take 1 tablet (100 mg) by mouth if needed for erectile dysfunction., Disp: 9 tablet, Rfl: 11   Past Surgical History:   Procedure Laterality Date    APPENDECTOMY  09/06/2017    Appendectomy      Past Medical History:   Diagnosis Date    Acute medial meniscus tear of right knee 08/03/2023    Acute pain of right knee 08/03/2023    Acute right ankle  pain 08/03/2023    Cellulitis of left lower limb 11/16/2019    Cellulitis of hip, left    Cramp and spasm 09/06/2017    Hand or foot spasms    Internal derangement of right knee 08/03/2023    Other acute sinusitis 09/28/2018    Other acute sinusitis, recurrence not specified    Personal history of other diseases of the respiratory system 09/29/2018    History of acute sinusitis    Personal history of other specified conditions 03/13/2019    History of fatigue    Personal history of other specified conditions 01/25/2021    History of dyspnea    Rash and other nonspecific skin eruption 01/25/2021    Rash    Strain of muscle and tendon of unspecified wall of thorax, initial encounter 03/24/2018    Strain of thoracic region, initial encounter     Social History     Tobacco Use    Smoking status: Former     Types: Cigarettes    Smokeless tobacco: Never   Vaping Use    Vaping status: Never Used   Substance Use Topics    Alcohol use: Yes    Drug use: Never      No family history on file.   Review of Systems    Objective   There were no vitals taken for this visit.   Physical Exam    Assessment/Plan   Problem List Items Addressed This Visit    None      Patient understands and agrees with treatment plan    Jeff Moyer, DO

## 2024-06-12 RX ORDER — DEXTROAMPHETAMINE SACCHARATE, AMPHETAMINE ASPARTATE MONOHYDRATE, DEXTROAMPHETAMINE SULFATE AND AMPHETAMINE SULFATE 5; 5; 5; 5 MG/1; MG/1; MG/1; MG/1
20 CAPSULE, EXTENDED RELEASE ORAL EVERY MORNING
Qty: 30 CAPSULE | Refills: 0 | Status: SHIPPED | OUTPATIENT
Start: 2024-06-12

## 2024-06-17 ENCOUNTER — APPOINTMENT (OUTPATIENT)
Dept: RHEUMATOLOGY | Facility: CLINIC | Age: 49
End: 2024-06-17
Payer: COMMERCIAL

## 2024-06-18 ENCOUNTER — LAB (OUTPATIENT)
Dept: LAB | Facility: LAB | Age: 49
End: 2024-06-18
Payer: COMMERCIAL

## 2024-06-18 ENCOUNTER — APPOINTMENT (OUTPATIENT)
Dept: RHEUMATOLOGY | Facility: CLINIC | Age: 49
End: 2024-06-18
Payer: COMMERCIAL

## 2024-06-18 VITALS
WEIGHT: 275 LBS | HEIGHT: 72 IN | BODY MASS INDEX: 37.25 KG/M2 | HEART RATE: 90 BPM | DIASTOLIC BLOOD PRESSURE: 88 MMHG | SYSTOLIC BLOOD PRESSURE: 152 MMHG

## 2024-06-18 DIAGNOSIS — M05.9 SEROPOSITIVE RHEUMATOID ARTHRITIS (MULTI): ICD-10-CM

## 2024-06-18 DIAGNOSIS — M1A.0790 IDIOPATHIC CHRONIC GOUT OF FOOT WITHOUT TOPHUS, UNSPECIFIED LATERALITY: ICD-10-CM

## 2024-06-18 DIAGNOSIS — M05.9 SEROPOSITIVE RHEUMATOID ARTHRITIS (MULTI): Primary | ICD-10-CM

## 2024-06-18 DIAGNOSIS — R41.3 MEMORY LOSS OR IMPAIRMENT: ICD-10-CM

## 2024-06-18 LAB
ALBUMIN SERPL BCP-MCNC: 4.5 G/DL (ref 3.4–5)
ALP SERPL-CCNC: 40 U/L (ref 33–120)
ALT SERPL W P-5'-P-CCNC: 43 U/L (ref 10–52)
ANION GAP SERPL CALC-SCNC: 15 MMOL/L (ref 10–20)
AST SERPL W P-5'-P-CCNC: 28 U/L (ref 9–39)
BILIRUB SERPL-MCNC: 0.6 MG/DL (ref 0–1.2)
BUN SERPL-MCNC: 15 MG/DL (ref 6–23)
CALCIUM SERPL-MCNC: 9.8 MG/DL (ref 8.6–10.6)
CHLORIDE SERPL-SCNC: 101 MMOL/L (ref 98–107)
CO2 SERPL-SCNC: 27 MMOL/L (ref 21–32)
CREAT SERPL-MCNC: 0.92 MG/DL (ref 0.5–1.3)
CRP SERPL-MCNC: <0.1 MG/DL
EGFRCR SERPLBLD CKD-EPI 2021: >90 ML/MIN/1.73M*2
ERYTHROCYTE [DISTWIDTH] IN BLOOD BY AUTOMATED COUNT: 13.4 % (ref 11.5–14.5)
ERYTHROCYTE [SEDIMENTATION RATE] IN BLOOD BY WESTERGREN METHOD: 7 MM/H (ref 0–15)
FOLATE SERPL-MCNC: >24 NG/ML
GLUCOSE SERPL-MCNC: 101 MG/DL (ref 74–99)
HCT VFR BLD AUTO: 41.2 % (ref 41–52)
HGB BLD-MCNC: 13.3 G/DL (ref 13.5–17.5)
HIV 1+2 AB+HIV1 P24 AG SERPL QL IA: NONREACTIVE
MCH RBC QN AUTO: 32 PG (ref 26–34)
MCHC RBC AUTO-ENTMCNC: 32.3 G/DL (ref 32–36)
MCV RBC AUTO: 99 FL (ref 80–100)
NRBC BLD-RTO: 0 /100 WBCS (ref 0–0)
PLATELET # BLD AUTO: 161 X10*3/UL (ref 150–450)
POTASSIUM SERPL-SCNC: 4.3 MMOL/L (ref 3.5–5.3)
PROT SERPL-MCNC: 6.9 G/DL (ref 6.4–8.2)
RBC # BLD AUTO: 4.15 X10*6/UL (ref 4.5–5.9)
SODIUM SERPL-SCNC: 139 MMOL/L (ref 136–145)
TREPONEMA PALLIDUM IGG+IGM AB [PRESENCE] IN SERUM OR PLASMA BY IMMUNOASSAY: NONREACTIVE
URATE SERPL-MCNC: 8.7 MG/DL (ref 4–7.5)
VIT B12 SERPL-MCNC: 265 PG/ML (ref 211–911)
WBC # BLD AUTO: 4.6 X10*3/UL (ref 4.4–11.3)

## 2024-06-18 PROCEDURE — 85027 COMPLETE CBC AUTOMATED: CPT

## 2024-06-18 PROCEDURE — 3077F SYST BP >= 140 MM HG: CPT | Performed by: INTERNAL MEDICINE

## 2024-06-18 PROCEDURE — 86780 TREPONEMA PALLIDUM: CPT

## 2024-06-18 PROCEDURE — 36415 COLL VENOUS BLD VENIPUNCTURE: CPT

## 2024-06-18 PROCEDURE — 87389 HIV-1 AG W/HIV-1&-2 AB AG IA: CPT

## 2024-06-18 PROCEDURE — 3079F DIAST BP 80-89 MM HG: CPT | Performed by: INTERNAL MEDICINE

## 2024-06-18 PROCEDURE — 82607 VITAMIN B-12: CPT

## 2024-06-18 PROCEDURE — 1036F TOBACCO NON-USER: CPT | Performed by: INTERNAL MEDICINE

## 2024-06-18 PROCEDURE — 99213 OFFICE O/P EST LOW 20 MIN: CPT | Performed by: INTERNAL MEDICINE

## 2024-06-18 PROCEDURE — 84550 ASSAY OF BLOOD/URIC ACID: CPT

## 2024-06-18 PROCEDURE — 80053 COMPREHEN METABOLIC PANEL: CPT

## 2024-06-18 PROCEDURE — 86140 C-REACTIVE PROTEIN: CPT

## 2024-06-18 PROCEDURE — 85652 RBC SED RATE AUTOMATED: CPT

## 2024-06-18 PROCEDURE — 3008F BODY MASS INDEX DOCD: CPT | Performed by: INTERNAL MEDICINE

## 2024-06-18 PROCEDURE — 82746 ASSAY OF FOLIC ACID SERUM: CPT

## 2024-06-18 NOTE — PATIENT INSTRUCTIONS
Take prednisone 5 mg daily for 1 week then 5 mg every other day for 1 week then stop.  Continue methotrexate, hydroxychloroquine and marrow injection as prescribed.  Call me if any question.  Follow-up in 3 months.

## 2024-06-18 NOTE — PROGRESS NOTES
Subjective  . Andrew Valencia is a 48 y.o. male who presents for No chief complaint on file..    HPI . 48-year-old male with history of seropositive RA, positive CHRIS, gout, GERD, class II obesity, ADHD, major depression presented for follow-up.     He is feeling much better.  Hand pain and stiffness has significantly reduced.  He states the injection was broken when he opened yesterday.  He has used 5 Humira injections and thus far and has had no side effects.     Immunosuppression: HCQ (10/2023), methotrexate (10/2023), Humira (3/2024) and Prednisone.    Review of Systems   All other systems reviewed and are negative.    Objective     Blood pressure 152/88, pulse 90, height 1.829 m (6'), weight 125 kg (275 lb).    Physical Exam.  Gen. AAO x3, NAD.  HEENT: No pallor or icterus, PERRLA, EOMI. Parotid glands  not enlarged. No cervical lymphadenopathy .  Skin: No rashes.  Heart: S1, S2/ RRR.   Lungs: CTA B.  Abdomen: Soft, NT/ND, BS regular.  MSK: No.swelling or tenderness joint.    Neuro: Sensation to touch intact.Strength 5/5 throughout.   Psych:Appropriate mood and behavior  EXT: No edema      Assessment/Plan  . 48-year-old male with history of seropositive RA, positive CHRIS, gout, GERD, class II obesity, ADHD, major depression presented for follow-up.    #1: Seropositive RA.  Great response to Humira.  No swollen or tender joint.  -Continue Humira injection every other week.  -Continue methotrexate 20 mg/week.  -Continue hydroxychloroquine twice a day.  -Patient was asked to take prednisone 5 mg daily for 1 week then 5 mg every other day for 1 week and then stop.  -Labs.    Follow-up in 3 months.     This note was partially generated using the Dragon Voice recognition system. There may be some incorrect wording, spelling and/or spelling errors or punctuation errors that were not corrected prior to committing the note to the medical record.      Problem List Items Addressed This Visit    None  Visit Diagnoses        Seropositive rheumatoid arthritis (Multi)    -  Primary    Relevant Orders    CBC    Sedimentation Rate    C-reactive protein                 Active Ambulatory Problems     Diagnosis Date Noted    ADHD (attention deficit hyperactivity disorder), combined type 08/03/2023    Elevated blood pressure, situational 08/03/2023    Gastroesophageal reflux disease 08/03/2023    Hypertriglyceridemia 08/03/2023    Acute gout involving toe of right foot 08/03/2023    Internal hemorrhoids 08/03/2023    Major depressive disorder, recurrent episode, moderate with anxious distress (Multi) 08/03/2023    Class 2 severe obesity due to excess calories with serious comorbidity and body mass index (BMI) of 36.0 to 36.9 in adult (Multi) 10/15/2023    On stimulant medication 10/15/2023    Rheumatoid arthritis of multiple sites with negative rheumatoid factor (Multi) 04/26/2024    Psoriatic arthritis (Multi) 04/26/2024     Resolved Ambulatory Problems     Diagnosis Date Noted    Acute medial meniscus tear of right knee 08/03/2023    Acute pain of right knee 08/03/2023    Acute right ankle pain 08/03/2023    Internal derangement of right knee 08/03/2023    Sprain of medial collateral ligament of right knee 08/03/2023    Pressure injury of left hip, stage 1 08/03/2023    Spasm 08/03/2023     Past Medical History:   Diagnosis Date    Cellulitis of left lower limb 11/16/2019    Cramp and spasm 09/06/2017    Other acute sinusitis 09/28/2018    Personal history of other diseases of the respiratory system 09/29/2018    Personal history of other specified conditions 03/13/2019    Personal history of other specified conditions 01/25/2021    Rash and other nonspecific skin eruption 01/25/2021    Strain of muscle and tendon of unspecified wall of thorax, initial encounter 03/24/2018       No family history on file.    Past Surgical History:   Procedure Laterality Date    APPENDECTOMY  09/06/2017    Appendectomy       Social History     Tobacco Use    Smoking Status Former    Types: Cigarettes   Smokeless Tobacco Never       Allergies  Bupropion, Escitalopram, and Paroxetine    Current Meds  Current Outpatient Medications   Medication Instructions    adalimumab (HUMIRA PEN) 40 mg, subcutaneous, Every 14 days    allopurinol (ZYLOPRIM) 300 mg, oral, Daily    amphetamine-dextroamphetamine XR (Adderall XR) 20 mg 24 hr capsule 20 mg, oral, Every morning    colchicine 0.6 mg, oral, Daily, As directed as starting allopurinol     folic acid (FOLVITE) 1 mg, oral, Daily    hydroxychloroquine (PLAQUENIL) 200 mg, oral, 2 times daily    methotrexate (TREXALL) 20 mg, oral, Once Weekly, Follow directions carefully, and ask to explain any part you do not understand. Take exactly as directed.    omeprazole (PRILOSEC) 40 mg, oral, Daily    sildenafil (VIAGRA) 100 mg, oral, As needed        Lab Results   Component Value Date    ANATITER 1:320 10/05/2023    C3 161 10/17/2023    RF 61 (H) 10/05/2023    SEDRATE 10 03/18/2024    CRP 0.18 03/18/2024    URICACID 4.2 10/17/2023    DNADS 1.0 10/05/2023             Edmund Barraza MD

## 2024-06-19 DIAGNOSIS — E53.8 VITAMIN B12 DEFICIENCY: Primary | ICD-10-CM

## 2024-06-19 DIAGNOSIS — M05.9 SEROPOSITIVE RHEUMATOID ARTHRITIS (MULTI): ICD-10-CM

## 2024-06-19 RX ORDER — LANOLIN ALCOHOL/MO/W.PET/CERES
1000 CREAM (GRAM) TOPICAL DAILY
Qty: 30 TABLET | Refills: 11 | Status: SHIPPED | OUTPATIENT
Start: 2024-06-19 | End: 2025-06-19

## 2024-06-19 RX ORDER — LANOLIN ALCOHOL/MO/W.PET/CERES
100 CREAM (GRAM) TOPICAL DAILY
Qty: 30 TABLET | Refills: 11 | Status: SHIPPED | OUTPATIENT
Start: 2024-06-19 | End: 2025-06-19

## 2024-06-19 RX ORDER — METHOTREXATE 2.5 MG/1
20 TABLET ORAL
Qty: 96 TABLET | Refills: 0 | Status: SHIPPED | OUTPATIENT
Start: 2024-06-23 | End: 2024-09-21

## 2024-06-24 DIAGNOSIS — M05.9 SEROPOSITIVE RHEUMATOID ARTHRITIS (MULTI): ICD-10-CM

## 2024-06-25 ENCOUNTER — HOSPITAL ENCOUNTER (OUTPATIENT)
Dept: RADIOLOGY | Facility: HOSPITAL | Age: 49
Discharge: HOME | End: 2024-06-25
Payer: COMMERCIAL

## 2024-06-25 DIAGNOSIS — R41.3 MEMORY LOSS OR IMPAIRMENT: ICD-10-CM

## 2024-06-25 PROCEDURE — 70551 MRI BRAIN STEM W/O DYE: CPT | Performed by: RADIOLOGY

## 2024-06-25 PROCEDURE — 70551 MRI BRAIN STEM W/O DYE: CPT

## 2024-06-26 DIAGNOSIS — F90.2 ADHD (ATTENTION DEFICIT HYPERACTIVITY DISORDER), COMBINED TYPE: ICD-10-CM

## 2024-06-26 RX ORDER — DEXTROAMPHETAMINE SACCHARATE, AMPHETAMINE ASPARTATE MONOHYDRATE, DEXTROAMPHETAMINE SULFATE AND AMPHETAMINE SULFATE 5; 5; 5; 5 MG/1; MG/1; MG/1; MG/1
20 CAPSULE, EXTENDED RELEASE ORAL EVERY MORNING
Qty: 30 CAPSULE | Refills: 0 | Status: SHIPPED | OUTPATIENT
Start: 2024-06-26

## 2024-07-02 ENCOUNTER — CLINICAL SUPPORT (OUTPATIENT)
Dept: SLEEP MEDICINE | Facility: CLINIC | Age: 49
End: 2024-07-02
Payer: COMMERCIAL

## 2024-07-02 DIAGNOSIS — R41.3 MEMORY LOSS OR IMPAIRMENT: ICD-10-CM

## 2024-07-02 NOTE — PROGRESS NOTES
Type of Study: HOME SLEEP STUDY - NOMAD     The patient received equipment and instructions for use of the [employer] [facility] Nomad HSAT device. The patient was instructed how to apply the effort belts, cannula, thermistor. It was also explained how the Nomad and oximeter components work.  The patient was asked to record their sleep for at least a 6-hour period.     The patient was informed of their responsibility for the device and acknowledged this by signing the HSAT device contract. The patient was asked to return the device on 7/3/2024 between the hours of 8am to 3pm to the Sleep [facility] in [facility].     The patient was instructed to call 911 as usual for any medical- emergencies while at home.  The patient was also given a phone number for troubleshooting when using the device in case there were additional questions.

## 2024-07-10 PROBLEM — G47.33 OBSTRUCTIVE SLEEP APNEA SYNDROME: Status: ACTIVE | Noted: 2024-07-10

## 2024-07-31 DIAGNOSIS — M05.9 SEROPOSITIVE RHEUMATOID ARTHRITIS (MULTI): ICD-10-CM

## 2024-08-01 RX ORDER — FOLIC ACID 1 MG/1
1 TABLET ORAL DAILY
Qty: 90 TABLET | Refills: 0 | Status: SHIPPED | OUTPATIENT
Start: 2024-08-01

## 2024-08-09 DIAGNOSIS — F90.2 ADHD (ATTENTION DEFICIT HYPERACTIVITY DISORDER), COMBINED TYPE: ICD-10-CM

## 2024-08-09 RX ORDER — DEXTROAMPHETAMINE SACCHARATE, AMPHETAMINE ASPARTATE MONOHYDRATE, DEXTROAMPHETAMINE SULFATE AND AMPHETAMINE SULFATE 5; 5; 5; 5 MG/1; MG/1; MG/1; MG/1
20 CAPSULE, EXTENDED RELEASE ORAL EVERY MORNING
Qty: 30 CAPSULE | Refills: 0 | Status: SHIPPED | OUTPATIENT
Start: 2024-08-09

## 2024-08-13 DIAGNOSIS — K21.9 GASTROESOPHAGEAL REFLUX DISEASE WITHOUT ESOPHAGITIS: ICD-10-CM

## 2024-08-13 RX ORDER — OMEPRAZOLE 40 MG/1
40 CAPSULE, DELAYED RELEASE ORAL DAILY
Qty: 90 CAPSULE | Refills: 0 | Status: SHIPPED | OUTPATIENT
Start: 2024-08-13

## 2024-08-14 DIAGNOSIS — M1A.00X0 IDIOPATHIC CHRONIC GOUT WITHOUT TOPHUS, UNSPECIFIED SITE: ICD-10-CM

## 2024-08-14 RX ORDER — ALLOPURINOL 300 MG/1
300 TABLET ORAL DAILY
Qty: 90 TABLET | Refills: 0 | Status: SHIPPED | OUTPATIENT
Start: 2024-08-14

## 2024-08-19 ENCOUNTER — APPOINTMENT (OUTPATIENT)
Dept: RHEUMATOLOGY | Facility: CLINIC | Age: 49
End: 2024-08-19
Payer: COMMERCIAL

## 2024-08-19 VITALS — SYSTOLIC BLOOD PRESSURE: 109 MMHG | DIASTOLIC BLOOD PRESSURE: 72 MMHG | BODY MASS INDEX: 37.3 KG/M2 | WEIGHT: 275 LBS

## 2024-08-19 DIAGNOSIS — M25.50 POLYARTHRALGIA: ICD-10-CM

## 2024-08-19 DIAGNOSIS — M05.9 SEROPOSITIVE RHEUMATOID ARTHRITIS (MULTI): ICD-10-CM

## 2024-08-19 DIAGNOSIS — M05.9 SEROPOSITIVE RHEUMATOID ARTHRITIS (MULTI): Primary | ICD-10-CM

## 2024-08-19 PROCEDURE — 99213 OFFICE O/P EST LOW 20 MIN: CPT | Performed by: INTERNAL MEDICINE

## 2024-08-19 PROCEDURE — 3078F DIAST BP <80 MM HG: CPT | Performed by: INTERNAL MEDICINE

## 2024-08-19 PROCEDURE — 3074F SYST BP LT 130 MM HG: CPT | Performed by: INTERNAL MEDICINE

## 2024-08-19 RX ORDER — HYDROXYCHLOROQUINE SULFATE 200 MG/1
200 TABLET, FILM COATED ORAL 2 TIMES DAILY
Qty: 180 TABLET | Refills: 1 | Status: SHIPPED | OUTPATIENT
Start: 2024-08-19 | End: 2024-11-17

## 2024-08-19 RX ORDER — METHOTREXATE 2.5 MG/1
20 TABLET ORAL
Qty: 96 TABLET | Refills: 0 | Status: SHIPPED | OUTPATIENT
Start: 2024-08-25 | End: 2024-11-23

## 2024-08-19 RX ORDER — FOLIC ACID 1 MG/1
1 TABLET ORAL DAILY
Qty: 90 TABLET | Refills: 1 | Status: SHIPPED | OUTPATIENT
Start: 2024-08-19

## 2024-08-19 RX ORDER — UBIDECARENONE 30 MG
30 CAPSULE ORAL DAILY
COMMUNITY

## 2024-08-19 NOTE — PROGRESS NOTES
Subjective . Andrew Valencia is a 48 y.o. male who presents for EPV.    HPI. 48-year-old male with history of seropositive RA, positive CHRIS, gout, GERD, class II obesity, ADHD, major depression presented for follow-up.     He stated that he is doing well however reports intermittent soreness and stiffness in the hands and ankles.  He has not noticed swelling of the joints.  He is able to perform ADLs.    Immunosuppression: HCQ (10/2023), methotrexate (10/2023), Humira (3/2024).    Past immunosuppression: Prednisone.    Review of Systems   All other systems reviewed and are negative.    Objective  .     Blood pressure 109/72, weight 125 kg (275 lb).    Physical Exam.  Gen. AAO x3, NAD.  HEENT: No pallor or icterus, PERRLA, EOMI. No cervical lymphadenopathy .  Skin: No rashes.  Heart: S1, S2/ RRR.   Lungs: CTA B.  Abdomen: Soft, NT/ND.  MSK: No.swelling or tenderness of the hands, wrist, elbows, knees, ankles and MTP joints.  Bilateral shoulder with full range of motion.  Neck, spine and SI joint without tenderness.    Neuro: Sensation to touch intact.Strength 5/5 throughout.   Psych:Appropriate mood and behavior  EXT: No edema    Assessment/Plan . 48-year-old male with history of seropositive RA, positive CHRIS, gout, GERD, class II obesity, ADHD, major depression presented for follow-up.     #1: Seropositive RA.  He is doing well.  -Continue Humira injection every other week.  -Continue methotrexate 8 pills/week.  -Continue hydroxychloroquine twice a day.  -Labs.        Follow-up in 3 months.     This note was partially generated using the Dragon Voice recognition system. There may be some incorrect wording, spelling and/or spelling errors or punctuation errors that were not corrected prior to committing the note to the medical record.        Problem List Items Addressed This Visit    None  Visit Diagnoses       Seropositive rheumatoid arthritis (Multi)    -  Primary    Relevant Medications    adalimumab (Humira  Pen) 40 mg/0.4 mL pen injector kit pen-injector    folic acid (Folvite) 1 mg tablet    methotrexate (Trexall) 2.5 mg tablet (Start on 8/25/2024)    Other Relevant Orders    CBC    C-Reactive Protein    Sedimentation Rate    Hepatic Function Panel    Polyarthralgia        Relevant Medications    hydroxychloroquine (Plaquenil) 200 mg tablet                 Active Ambulatory Problems     Diagnosis Date Noted    ADHD (attention deficit hyperactivity disorder), combined type 08/03/2023    Elevated blood pressure, situational 08/03/2023    Gastroesophageal reflux disease 08/03/2023    Hypertriglyceridemia 08/03/2023    Acute gout involving toe of right foot 08/03/2023    Internal hemorrhoids 08/03/2023    Major depressive disorder, recurrent episode, moderate with anxious distress (Multi) 08/03/2023    Class 2 severe obesity due to excess calories with serious comorbidity and body mass index (BMI) of 36.0 to 36.9 in adult (Multi) 10/15/2023    On stimulant medication 10/15/2023    Rheumatoid arthritis of multiple sites with negative rheumatoid factor (Multi) 04/26/2024    Psoriatic arthritis (Multi) 04/26/2024    Obstructive sleep apnea syndrome 07/10/2024     Resolved Ambulatory Problems     Diagnosis Date Noted    Acute medial meniscus tear of right knee 08/03/2023    Acute pain of right knee 08/03/2023    Acute right ankle pain 08/03/2023    Internal derangement of right knee 08/03/2023    Sprain of medial collateral ligament of right knee 08/03/2023    Pressure injury of left hip, stage 1 08/03/2023    Spasm 08/03/2023     Past Medical History:   Diagnosis Date    Cellulitis of left lower limb 11/16/2019    Cramp and spasm 09/06/2017    Other acute sinusitis 09/28/2018    Personal history of other diseases of the respiratory system 09/29/2018    Personal history of other specified conditions 03/13/2019    Personal history of other specified conditions 01/25/2021    Rash and other nonspecific skin eruption 01/25/2021     Strain of muscle and tendon of unspecified wall of thorax, initial encounter 03/24/2018       No family history on file.    Past Surgical History:   Procedure Laterality Date    APPENDECTOMY  09/06/2017    Appendectomy       Social History     Tobacco Use   Smoking Status Former    Types: Cigarettes   Smokeless Tobacco Never       Allergies  Bupropion, Escitalopram, and Paroxetine    Current Meds  Current Outpatient Medications   Medication Instructions    adalimumab (HUMIRA PEN) 40 mg, subcutaneous, Every 14 days    allopurinol (ZYLOPRIM) 300 mg, oral, Daily    amphetamine-dextroamphetamine XR (Adderall XR) 20 mg 24 hr capsule 20 mg, oral, Every morning    co-enzyme Q-10 30 mg, oral, Daily    colchicine 0.6 mg, oral, Daily, As directed as starting allopurinol     cyanocobalamin (VITAMIN B-12) 1,000 mcg, oral, Daily    folic acid (FOLVITE) 1 mg, oral, Daily    hydroxychloroquine (PLAQUENIL) 200 mg, oral, 2 times daily    [START ON 8/25/2024] methotrexate (TREXALL) 20 mg, oral, Once Weekly, Follow directions carefully, and ask to explain any part you do not understand. Take exactly as directed.    omeprazole (PRILOSEC) 40 mg, oral, Daily    sildenafil (VIAGRA) 100 mg, oral, As needed    thiamine (VITAMIN B-1) 100 mg, oral, Daily        Lab Results   Component Value Date    ANATITER 1:320 10/05/2023    C3 161 10/17/2023    RF 61 (H) 10/05/2023    SEDRATE 7 06/18/2024    CRP <0.10 06/18/2024    URICACID 8.7 (H) 06/18/2024    DNADS 1.0 10/05/2023             Edmund Barraza MD

## 2024-08-19 NOTE — PROGRESS NOTES
Humira PA approved through 3/2025. Per insurance pt must fill with [facility]. Updated [facility] rx [facility] to [facility]

## 2024-09-03 DIAGNOSIS — M05.9 SEROPOSITIVE RHEUMATOID ARTHRITIS (MULTI): ICD-10-CM

## 2024-09-04 RX ORDER — ADALIMUMAB 40MG/0.4ML
KIT SUBCUTANEOUS
Qty: 2 EACH | Refills: 2 | Status: SHIPPED | OUTPATIENT
Start: 2024-09-04

## 2024-09-06 DIAGNOSIS — F90.2 ADHD (ATTENTION DEFICIT HYPERACTIVITY DISORDER), COMBINED TYPE: ICD-10-CM

## 2024-09-06 RX ORDER — DEXTROAMPHETAMINE SACCHARATE, AMPHETAMINE ASPARTATE MONOHYDRATE, DEXTROAMPHETAMINE SULFATE AND AMPHETAMINE SULFATE 5; 5; 5; 5 MG/1; MG/1; MG/1; MG/1
20 CAPSULE, EXTENDED RELEASE ORAL EVERY MORNING
Qty: 30 CAPSULE | Refills: 0 | Status: SHIPPED | OUTPATIENT
Start: 2024-09-06

## 2024-10-10 DIAGNOSIS — F90.2 ADHD (ATTENTION DEFICIT HYPERACTIVITY DISORDER), COMBINED TYPE: ICD-10-CM

## 2024-10-10 RX ORDER — DEXTROAMPHETAMINE SACCHARATE, AMPHETAMINE ASPARTATE MONOHYDRATE, DEXTROAMPHETAMINE SULFATE AND AMPHETAMINE SULFATE 5; 5; 5; 5 MG/1; MG/1; MG/1; MG/1
20 CAPSULE, EXTENDED RELEASE ORAL EVERY MORNING
Qty: 30 CAPSULE | Refills: 0 | Status: SHIPPED | OUTPATIENT
Start: 2024-10-10

## 2024-10-30 ENCOUNTER — TELEPHONE (OUTPATIENT)
Dept: PRIMARY CARE | Facility: CLINIC | Age: 49
End: 2024-10-30
Payer: COMMERCIAL

## 2024-10-30 DIAGNOSIS — Z51.81 ENCOUNTER FOR MONITORING STIMULANT THERAPY: Primary | ICD-10-CM

## 2024-10-30 DIAGNOSIS — Z79.899 ENCOUNTER FOR MONITORING STIMULANT THERAPY: Primary | ICD-10-CM

## 2024-11-05 ENCOUNTER — APPOINTMENT (OUTPATIENT)
Dept: PRIMARY CARE | Facility: CLINIC | Age: 49
End: 2024-11-05
Payer: COMMERCIAL

## 2024-11-05 VITALS
SYSTOLIC BLOOD PRESSURE: 138 MMHG | HEART RATE: 92 BPM | OXYGEN SATURATION: 97 % | WEIGHT: 280 LBS | DIASTOLIC BLOOD PRESSURE: 80 MMHG | HEIGHT: 72 IN | BODY MASS INDEX: 37.93 KG/M2

## 2024-11-05 DIAGNOSIS — Z79.899 ENCOUNTER FOR MONITORING STIMULANT THERAPY: ICD-10-CM

## 2024-11-05 DIAGNOSIS — G47.33 OSA (OBSTRUCTIVE SLEEP APNEA): Primary | ICD-10-CM

## 2024-11-05 DIAGNOSIS — Z51.81 ENCOUNTER FOR MONITORING STIMULANT THERAPY: ICD-10-CM

## 2024-11-05 LAB
AMPHETAMINES UR QL SCN: ABNORMAL
BARBITURATES UR QL SCN: ABNORMAL
BENZODIAZ UR QL SCN: ABNORMAL
BZE UR QL SCN: ABNORMAL
CANNABINOIDS UR QL SCN: ABNORMAL
FENTANYL+NORFENTANYL UR QL SCN: ABNORMAL
METHADONE UR QL SCN: ABNORMAL
OPIATES UR QL SCN: ABNORMAL
OXYCODONE+OXYMORPHONE UR QL SCN: ABNORMAL
PCP UR QL SCN: ABNORMAL

## 2024-11-05 PROCEDURE — 3075F SYST BP GE 130 - 139MM HG: CPT

## 2024-11-05 PROCEDURE — 1036F TOBACCO NON-USER: CPT

## 2024-11-05 PROCEDURE — 99213 OFFICE O/P EST LOW 20 MIN: CPT

## 2024-11-05 PROCEDURE — 80353 DRUG SCREENING COCAINE: CPT

## 2024-11-05 PROCEDURE — 3008F BODY MASS INDEX DOCD: CPT

## 2024-11-05 PROCEDURE — 3079F DIAST BP 80-89 MM HG: CPT

## 2024-11-05 PROCEDURE — 80307 DRUG TEST PRSMV CHEM ANLYZR: CPT

## 2024-11-05 NOTE — PROGRESS NOTES
Subjective   Patient ID: Andrew Valencia is a 48 y.o. male who presents for Follow-up (Follow up on sleep apnea ).  HPI  - Home sleep study test in July showed severe CARLO  - He has been using his machine about 3 months  - some nights gets 2-3 hours, once in awhile makes it all night. When he does make it all night he feels great in the morning  - only has nasal piece, feels like it is too big  - feels like he is suffocating when it is blowing air down his throat faster than he can breathe in   - he is using [employer] -- has no monitoring, no oversight   - no CP, SOB, leg edema    - feels like penis is going inside his body   - happens multiple times each day  - not painful but uncomfortable   - has to manually extract it either by grabbing and pulling or pushing it   - has been going on for years but hesitant to talk about it   - no urinary sx   - does not happen when he takes the viagra    - memory has been a little better since getting more quality sleep    - working on his diet       Current Outpatient Medications:     allopurinol (Zyloprim) 300 mg tablet, Take 1 tablet by mouth once daily, Disp: 90 tablet, Rfl: 0    amphetamine-dextroamphetamine XR (Adderall XR) 20 mg 24 hr capsule, Take 1 capsule (20 mg) by mouth once daily in the morning., Disp: 30 capsule, Rfl: 0    co-enzyme Q-10 30 mg capsule, Take 1 capsule (30 mg) by mouth once daily., Disp: , Rfl:     colchicine, gout, 0.6 mg tablet, Take 1 tablet (0.6 mg) by mouth once daily. As directed as starting allopurinol, Disp: , Rfl:     cyanocobalamin (Vitamin B-12) 1,000 mcg tablet, Take 1 tablet (1,000 mcg) by mouth once daily., Disp: 30 tablet, Rfl: 11    folic acid (Folvite) 1 mg tablet, Take 1 tablet (1 mg) by mouth once daily., Disp: 90 tablet, Rfl: 1    Humira,CF, Pen 40 mg/0.4 mL pen injector kit pen-injector, GIVE 1 INJECTION (40 MG) SUBCUTANEOUSLY ONCE EVERY 2 WEEKS., Disp: 2 each, Rfl: 2    hydroxychloroquine (Plaquenil) 200 mg tablet, Take 1 tablet  (200 mg) by mouth 2 times a day., Disp: 180 tablet, Rfl: 1    methotrexate (Trexall) 2.5 mg tablet, Take 8 tablets (20 mg total) by mouth 1 (one) time per week.  Follow directions carefully, and ask to explain any part you do not understand. Take exactly as directed., Disp: 96 tablet, Rfl: 0    omeprazole (PriLOSEC) 40 mg DR capsule, Take 1 capsule by mouth once daily, Disp: 90 capsule, Rfl: 0    sildenafil (Viagra) 100 mg tablet, Take 1 tablet (100 mg) by mouth if needed for erectile dysfunction., Disp: 9 tablet, Rfl: 11    thiamine (Vitamin B-1) 100 mg tablet, Take 1 tablet (100 mg) by mouth once daily., Disp: 30 tablet, Rfl: 11   Past Surgical History:   Procedure Laterality Date    APPENDECTOMY  09/06/2017    Appendectomy      Past Medical History:   Diagnosis Date    Acute medial meniscus tear of right knee 08/03/2023    Acute pain of right knee 08/03/2023    Acute right ankle pain 08/03/2023    Cellulitis of left lower limb 11/16/2019    Cellulitis of hip, left    Cramp and spasm 09/06/2017    Hand or foot spasms    Internal derangement of right knee 08/03/2023    Other acute sinusitis 09/28/2018    Other acute sinusitis, recurrence not specified    Personal history of other diseases of the respiratory system 09/29/2018    History of acute sinusitis    Personal history of other specified conditions 03/13/2019    History of fatigue    Personal history of other specified conditions 01/25/2021    History of dyspnea    Rash and other nonspecific skin eruption 01/25/2021    Rash    Strain of muscle and tendon of unspecified wall of thorax, initial encounter 03/24/2018    Strain of thoracic region, initial encounter     Social History     Tobacco Use    Smoking status: Former     Types: Cigarettes    Smokeless tobacco: Never   Vaping Use    Vaping status: Never Used   Substance Use Topics    Alcohol use: Yes    Drug use: Never      No family history on file.   Review of Systems  10 point ROS negative except as  "otherwise noted in the HPI.      Objective   /80   Pulse 92   Ht 1.829 m (6')   Wt 127 kg (280 lb)   SpO2 97%   BMI 37.97 kg/m²    Physical Exam  Vitals reviewed.   Constitutional:       Appearance: Normal appearance. He is obese.   HENT:      Head: Normocephalic and atraumatic.   Cardiovascular:      Rate and Rhythm: Normal rate and regular rhythm.      Pulses: Normal pulses.      Heart sounds: Normal heart sounds.   Pulmonary:      Effort: Pulmonary effort is normal.      Breath sounds: Normal breath sounds.   Abdominal:      General: Abdomen is flat. Bowel sounds are normal.      Palpations: Abdomen is soft.   Musculoskeletal:      Cervical back: Normal range of motion and neck supple.   Skin:     General: Skin is warm and dry.      Findings: No rash.   Neurological:      General: No focal deficit present.      Mental Status: He is alert and oriented to person, place, and time.   Psychiatric:         Mood and Affect: Mood normal.         Behavior: Behavior normal.           Assessment/Plan   Problem List Items Addressed This Visit    None  Visit Diagnoses       CARLO (obstructive sleep apnea)    -  Primary  - likely needs settings and mask adjustments. He is not getting enough support from [facility]. Establish care w sleep medicine    Relevant Orders    Referral to Adult Sleep Medicine    Encounter for monitoring stimulant therapy    - did UA today for Dr Moyer, adderall rx       Pt also brings up today that he has had some issues with his penis \"going inside his body.\" Has to manually extract it. Has been going on for years but has been embarrassed to seek help about it. Will d/w Dr Moyer. C/f hidden penis. May need to see urology. Will update pt recs.        Discussed at visit any disease processes that were of concern as well as the risks, benefits and instructions on any new medication provided. Patient (and/or caretaker of patient if present) stated all questions were answered, and they voiced " understanding of instructions.     Kristi Montes PA-C

## 2024-11-09 LAB — BZE UR-MCNC: 1797 NG/ML

## 2024-11-11 DIAGNOSIS — M1A.00X0 IDIOPATHIC CHRONIC GOUT WITHOUT TOPHUS, UNSPECIFIED SITE: ICD-10-CM

## 2024-11-11 RX ORDER — ALLOPURINOL 300 MG/1
300 TABLET ORAL DAILY
Qty: 90 TABLET | Refills: 0 | Status: SHIPPED | OUTPATIENT
Start: 2024-11-11

## 2024-11-13 DIAGNOSIS — K21.9 GASTROESOPHAGEAL REFLUX DISEASE WITHOUT ESOPHAGITIS: ICD-10-CM

## 2024-11-13 RX ORDER — OMEPRAZOLE 40 MG/1
40 CAPSULE, DELAYED RELEASE ORAL DAILY
Qty: 90 CAPSULE | Refills: 0 | Status: SHIPPED | OUTPATIENT
Start: 2024-11-13

## 2024-11-15 ENCOUNTER — OFFICE VISIT (OUTPATIENT)
Dept: PRIMARY CARE | Facility: CLINIC | Age: 49
End: 2024-11-15
Payer: COMMERCIAL

## 2024-11-15 VITALS
SYSTOLIC BLOOD PRESSURE: 132 MMHG | BODY MASS INDEX: 38.19 KG/M2 | HEART RATE: 92 BPM | HEIGHT: 72 IN | OXYGEN SATURATION: 98 % | WEIGHT: 282 LBS | DIASTOLIC BLOOD PRESSURE: 78 MMHG

## 2024-11-15 DIAGNOSIS — Z79.899 ENCOUNTER FOR MONITORING STIMULANT THERAPY: ICD-10-CM

## 2024-11-15 DIAGNOSIS — Z51.81 ENCOUNTER FOR MONITORING STIMULANT THERAPY: ICD-10-CM

## 2024-11-15 DIAGNOSIS — F90.2 ADHD (ATTENTION DEFICIT HYPERACTIVITY DISORDER), COMBINED TYPE: Primary | ICD-10-CM

## 2024-11-15 LAB
AMPHETAMINES UR QL SCN: NORMAL
BARBITURATES UR QL SCN: NORMAL
BENZODIAZ UR QL SCN: NORMAL
BZE UR QL SCN: NORMAL
CANNABINOIDS UR QL SCN: NORMAL
FENTANYL+NORFENTANYL UR QL SCN: NORMAL
METHADONE UR QL SCN: NORMAL
OPIATES UR QL SCN: NORMAL
OXYCODONE+OXYMORPHONE UR QL SCN: NORMAL
PCP UR QL SCN: NORMAL

## 2024-11-15 PROCEDURE — 3078F DIAST BP <80 MM HG: CPT | Performed by: FAMILY MEDICINE

## 2024-11-15 PROCEDURE — 3075F SYST BP GE 130 - 139MM HG: CPT | Performed by: FAMILY MEDICINE

## 2024-11-15 PROCEDURE — 80307 DRUG TEST PRSMV CHEM ANLYZR: CPT

## 2024-11-15 PROCEDURE — 3008F BODY MASS INDEX DOCD: CPT | Performed by: FAMILY MEDICINE

## 2024-11-15 PROCEDURE — 99213 OFFICE O/P EST LOW 20 MIN: CPT | Performed by: FAMILY MEDICINE

## 2024-11-15 PROCEDURE — 1036F TOBACCO NON-USER: CPT | Performed by: FAMILY MEDICINE

## 2024-11-15 NOTE — PROGRESS NOTES
Subjective   Patient ID: Andrew Valencia is a 48 y.o. male who presents for Follow-up (Discuss drug screen ).  HPI  Did do cocaine once at a party  Use to have a problem with cocaine  Had not done it in years  Cutting back on alcohol too  No CP, SOB, palpitations    Current Outpatient Medications:     allopurinol (Zyloprim) 300 mg tablet, Take 1 tablet by mouth once daily, Disp: 90 tablet, Rfl: 0    amphetamine-dextroamphetamine XR (Adderall XR) 20 mg 24 hr capsule, Take 1 capsule (20 mg) by mouth once daily in the morning., Disp: 30 capsule, Rfl: 0    co-enzyme Q-10 30 mg capsule, Take 1 capsule (30 mg) by mouth once daily., Disp: , Rfl:     colchicine, gout, 0.6 mg tablet, Take 1 tablet (0.6 mg) by mouth once daily. As directed as starting allopurinol, Disp: , Rfl:     cyanocobalamin (Vitamin B-12) 1,000 mcg tablet, Take 1 tablet (1,000 mcg) by mouth once daily., Disp: 30 tablet, Rfl: 11    folic acid (Folvite) 1 mg tablet, Take 1 tablet (1 mg) by mouth once daily., Disp: 90 tablet, Rfl: 1    Humira,CF, Pen 40 mg/0.4 mL pen injector kit pen-injector, GIVE 1 INJECTION (40 MG) SUBCUTANEOUSLY ONCE EVERY 2 WEEKS., Disp: 2 each, Rfl: 2    hydroxychloroquine (Plaquenil) 200 mg tablet, Take 1 tablet (200 mg) by mouth 2 times a day., Disp: 180 tablet, Rfl: 1    methotrexate (Trexall) 2.5 mg tablet, Take 8 tablets (20 mg total) by mouth 1 (one) time per week.  Follow directions carefully, and ask to explain any part you do not understand. Take exactly as directed., Disp: 96 tablet, Rfl: 0    omeprazole (PriLOSEC) 40 mg DR capsule, Take 1 capsule by mouth once daily, Disp: 90 capsule, Rfl: 0    sildenafil (Viagra) 100 mg tablet, Take 1 tablet (100 mg) by mouth if needed for erectile dysfunction., Disp: 9 tablet, Rfl: 11    thiamine (Vitamin B-1) 100 mg tablet, Take 1 tablet (100 mg) by mouth once daily., Disp: 30 tablet, Rfl: 11   Past Surgical History:   Procedure Laterality Date    APPENDECTOMY  09/06/2017     Appendectomy      Past Medical History:   Diagnosis Date    Acute medial meniscus tear of right knee 08/03/2023    Acute pain of right knee 08/03/2023    Acute right ankle pain 08/03/2023    Cellulitis of left lower limb 11/16/2019    Cellulitis of hip, left    Cramp and spasm 09/06/2017    Hand or foot spasms    Internal derangement of right knee 08/03/2023    Other acute sinusitis 09/28/2018    Other acute sinusitis, recurrence not specified    Personal history of other diseases of the respiratory system 09/29/2018    History of acute sinusitis    Personal history of other specified conditions 03/13/2019    History of fatigue    Personal history of other specified conditions 01/25/2021    History of dyspnea    Rash and other nonspecific skin eruption 01/25/2021    Rash    Strain of muscle and tendon of unspecified wall of thorax, initial encounter 03/24/2018    Strain of thoracic region, initial encounter     Social History     Tobacco Use    Smoking status: Former     Types: Cigarettes    Smokeless tobacco: Never   Vaping Use    Vaping status: Never Used   Substance Use Topics    Alcohol use: Yes    Drug use: Never      No family history on file.   Review of Systems    Objective   /78   Pulse 92   Ht 1.829 m (6')   Wt 128 kg (282 lb)   SpO2 98%   BMI 38.25 kg/m²    Physical Exam  Vitals and nursing note reviewed.   Constitutional:       General: He is not in acute distress.     Appearance: Normal appearance. He is not ill-appearing.   Eyes:      Extraocular Movements: Extraocular movements intact.      Conjunctiva/sclera: Conjunctivae normal.      Pupils: Pupils are equal, round, and reactive to light.   Skin:     Capillary Refill: Capillary refill takes less than 2 seconds.   Neurological:      General: No focal deficit present.      Mental Status: He is alert and oriented to person, place, and time.   Psychiatric:         Mood and Affect: Mood normal.         Behavior: Behavior normal.          Assessment/Plan   Problem List Items Addressed This Visit       ADHD (attention deficit hyperactivity disorder), combined type - Primary     Other Visit Diagnoses       Encounter for monitoring stimulant therapy        Relevant Orders    Drug Screen, Urine With Reflex to Confirmation        Told no cocaine use or I stop writing for the Adderall  Denies has an issue with cocaine  He repeated UDS to make sure clean today    Patient understands and agrees with treatment plan    Jeff Moyer, DO

## 2024-11-18 ENCOUNTER — APPOINTMENT (OUTPATIENT)
Dept: RHEUMATOLOGY | Facility: CLINIC | Age: 49
End: 2024-11-18
Payer: COMMERCIAL

## 2024-11-18 ENCOUNTER — LAB (OUTPATIENT)
Dept: LAB | Facility: LAB | Age: 49
End: 2024-11-18
Payer: COMMERCIAL

## 2024-11-18 VITALS
SYSTOLIC BLOOD PRESSURE: 143 MMHG | DIASTOLIC BLOOD PRESSURE: 97 MMHG | BODY MASS INDEX: 37.93 KG/M2 | HEART RATE: 84 BPM | HEIGHT: 72 IN | WEIGHT: 280 LBS

## 2024-11-18 DIAGNOSIS — M05.9 SEROPOSITIVE RHEUMATOID ARTHRITIS: Primary | ICD-10-CM

## 2024-11-18 DIAGNOSIS — M05.9 SEROPOSITIVE RHEUMATOID ARTHRITIS: ICD-10-CM

## 2024-11-18 LAB
ALBUMIN SERPL BCP-MCNC: 4.6 G/DL (ref 3.4–5)
ALP SERPL-CCNC: 43 U/L (ref 33–120)
ALT SERPL W P-5'-P-CCNC: 39 U/L (ref 10–52)
AST SERPL W P-5'-P-CCNC: 26 U/L (ref 9–39)
BILIRUB DIRECT SERPL-MCNC: 0.1 MG/DL (ref 0–0.3)
BILIRUB SERPL-MCNC: 0.6 MG/DL (ref 0–1.2)
CRP SERPL-MCNC: 0.23 MG/DL
ERYTHROCYTE [DISTWIDTH] IN BLOOD BY AUTOMATED COUNT: 12.4 % (ref 11.5–14.5)
ERYTHROCYTE [SEDIMENTATION RATE] IN BLOOD BY WESTERGREN METHOD: 18 MM/H (ref 0–15)
HCT VFR BLD AUTO: 42.1 % (ref 41–52)
HGB BLD-MCNC: 13.8 G/DL (ref 13.5–17.5)
MCH RBC QN AUTO: 31.4 PG (ref 26–34)
MCHC RBC AUTO-ENTMCNC: 32.8 G/DL (ref 32–36)
MCV RBC AUTO: 96 FL (ref 80–100)
NRBC BLD-RTO: 0 /100 WBCS (ref 0–0)
PLATELET # BLD AUTO: 223 X10*3/UL (ref 150–450)
PROT SERPL-MCNC: 7.2 G/DL (ref 6.4–8.2)
RBC # BLD AUTO: 4.39 X10*6/UL (ref 4.5–5.9)
WBC # BLD AUTO: 4.1 X10*3/UL (ref 4.4–11.3)

## 2024-11-18 PROCEDURE — 3008F BODY MASS INDEX DOCD: CPT | Performed by: INTERNAL MEDICINE

## 2024-11-18 PROCEDURE — 82955 ASSAY OF G6PD ENZYME: CPT

## 2024-11-18 PROCEDURE — 86481 TB AG RESPONSE T-CELL SUSP: CPT

## 2024-11-18 PROCEDURE — 86140 C-REACTIVE PROTEIN: CPT

## 2024-11-18 PROCEDURE — 99213 OFFICE O/P EST LOW 20 MIN: CPT | Performed by: INTERNAL MEDICINE

## 2024-11-18 PROCEDURE — 85027 COMPLETE CBC AUTOMATED: CPT

## 2024-11-18 PROCEDURE — 3077F SYST BP >= 140 MM HG: CPT | Performed by: INTERNAL MEDICINE

## 2024-11-18 PROCEDURE — 1036F TOBACCO NON-USER: CPT | Performed by: INTERNAL MEDICINE

## 2024-11-18 PROCEDURE — 80076 HEPATIC FUNCTION PANEL: CPT

## 2024-11-18 PROCEDURE — 85652 RBC SED RATE AUTOMATED: CPT

## 2024-11-18 PROCEDURE — 3080F DIAST BP >= 90 MM HG: CPT | Performed by: INTERNAL MEDICINE

## 2024-11-18 PROCEDURE — 36415 COLL VENOUS BLD VENIPUNCTURE: CPT

## 2024-11-18 RX ORDER — METHYLPREDNISOLONE 4 MG/1
TABLET ORAL
Qty: 21 TABLET | Refills: 0 | Status: SHIPPED | OUTPATIENT
Start: 2024-11-18 | End: 2024-11-25

## 2024-11-18 RX ORDER — SULFASALAZINE 500 MG/1
500 TABLET ORAL 2 TIMES DAILY
Qty: 180 TABLET | Refills: 0 | Status: SHIPPED | OUTPATIENT
Start: 2024-11-18 | End: 2025-02-16

## 2024-11-18 NOTE — PATIENT INSTRUCTIONS
Discontinue hydroxychloroquine and begin sulfasalazine 1 pill twice a day.  Take Medrol Dosepak as prescribed.  Continue Humira injection and methotrexate.  Call me if any question.  Follow-up in 3 months.

## 2024-11-18 NOTE — PROGRESS NOTES
Subjective . Andrew Valencia is a 48 y.o. male who presents for Follow-up (3 month follow up).    HPI.  48-year-old male with history of seropositive RA, positive CHRIS, gout, GERD, class II obesity, ADHD, major depression presented for follow-up.   He stated that he is not doing well.  Reports persistent soreness and stiffness in the hands, ankles, shoulders and neck. He has not noticed swelling of the knuckles, ankles and knees.    Immunosuppression: HCQ (10/2023), methotrexate (10/2023), Humira (3/2024).     Past immunosuppression: Prednisone.     Review of Systems   All other systems reviewed and are negative.    Objective     Blood pressure (!) 143/97, pulse 84, height 1.829 m (6'), weight 127 kg (280 lb).    Physical Exam.  Gen. AAO x3, NAD.  HEENT: No pallor or icterus, PERRLA, EOMI.No cervical lymphadenopathy .  Skin: No rashes.  Heart: S1, S2/ RRR.  Lungs: CTA B.  Abdomen: Soft, NT/ND.  MSK: No.swelling or tenderness of the hands, wrist, elbows, shoulders, knees, ankles and MTP joints.    Neuro: Sensation to touch intact.Strength 5/5 throughout.   Psych:Appropriate mood and behavior  EXT: No edema    Assessment/Plan . 48-year-old male with history of seropositive RA, positive CHRIS, gout, GERD, class II obesity, ADHD, major depression presented for follow-up.     #1: Seropositive RA.  No swollen or tender joint.  Difficult to say whether he has active RA.  -Trial of Medrol Dosepak.  -Discontinue hydroxychloroquine and begin sulfasalazine.  -Continue methotrexate 8 pills/week.  -Continue Humira injection every other week.  -Labs.    Follow-up in 3 months.     This note was partially generated using the Dragon Voice recognition system. There may be some incorrect wording, spelling and/or spelling errors or punctuation errors that were not corrected prior to committing the note to the medical record.        Problem List Items Addressed This Visit    None  Visit Diagnoses       Seropositive rheumatoid arthritis     -  Primary    Relevant Medications    sulfaSALAzine (Azulfidine) 500 mg tablet    methylPREDNISolone (Medrol Dospak) 4 mg tablets    Other Relevant Orders    CBC    C-Reactive Protein    Hepatic Function Panel    Sedimentation Rate    T-Spot TB    Glucose-6-Phosphate Dehydrogenase, Quantitiative                 Active Ambulatory Problems     Diagnosis Date Noted    ADHD (attention deficit hyperactivity disorder), combined type 08/03/2023    Elevated blood pressure, situational 08/03/2023    Gastroesophageal reflux disease 08/03/2023    Hypertriglyceridemia 08/03/2023    Acute gout involving toe of right foot 08/03/2023    Internal hemorrhoids 08/03/2023    Major depressive disorder, recurrent episode, moderate with anxious distress (Multi) 08/03/2023    Class 2 severe obesity due to excess calories with serious comorbidity and body mass index (BMI) of 36.0 to 36.9 in adult 10/15/2023    On stimulant medication 10/15/2023    Rheumatoid arthritis of multiple sites with negative rheumatoid factor (Multi) 04/26/2024    Psoriatic arthritis (Multi) 04/26/2024    Obstructive sleep apnea syndrome 07/10/2024     Resolved Ambulatory Problems     Diagnosis Date Noted    Acute medial meniscus tear of right knee 08/03/2023    Acute pain of right knee 08/03/2023    Acute right ankle pain 08/03/2023    Internal derangement of right knee 08/03/2023    Sprain of medial collateral ligament of right knee 08/03/2023    Pressure injury of left hip, stage 1 08/03/2023    Spasm 08/03/2023     Past Medical History:   Diagnosis Date    Cellulitis of left lower limb 11/16/2019    Cramp and spasm 09/06/2017    Other acute sinusitis 09/28/2018    Personal history of other diseases of the respiratory system 09/29/2018    Personal history of other specified conditions 03/13/2019    Personal history of other specified conditions 01/25/2021    Rash and other nonspecific skin eruption 01/25/2021    Strain of muscle and tendon of unspecified wall  of thorax, initial encounter 03/24/2018       No family history on file.    Past Surgical History:   Procedure Laterality Date    APPENDECTOMY  09/06/2017    Appendectomy       Social History     Tobacco Use   Smoking Status Former    Types: Cigarettes   Smokeless Tobacco Never       Allergies  Bupropion, Escitalopram, and Paroxetine    Current Meds  Current Outpatient Medications   Medication Instructions    allopurinol (ZYLOPRIM) 300 mg, oral, Daily    amphetamine-dextroamphetamine XR (Adderall XR) 20 mg 24 hr capsule 20 mg, oral, Every morning    co-enzyme Q-10 30 mg, Daily    colchicine 0.6 mg, Daily    cyanocobalamin (VITAMIN B-12) 1,000 mcg, oral, Daily    folic acid (FOLVITE) 1 mg, oral, Daily    Humira,CF, Pen 40 mg/0.4 mL pen injector kit pen-injector GIVE 1 INJECTION (40 MG) SUBCUTANEOUSLY ONCE EVERY 2 WEEKS.    methotrexate (TREXALL) 20 mg, oral, Once Weekly, Follow directions carefully, and ask to explain any part you do not understand. Take exactly as directed.    methylPREDNISolone (Medrol Dospak) 4 mg tablets Follow schedule on package instructions    omeprazole (PRILOSEC) 40 mg, oral, Daily    sildenafil (VIAGRA) 100 mg, oral, As needed    sulfaSALAzine (AZULFIDINE) 500 mg, oral, 2 times daily    thiamine (VITAMIN B-1) 100 mg, oral, Daily        Lab Results   Component Value Date    ANATITER 1:320 10/05/2023    C3 161 10/17/2023    C4 24 10/17/2023    RF 61 (H) 10/05/2023    SEDRATE 7 06/18/2024    CRP <0.10 06/18/2024    URICACID 8.7 (H) 06/18/2024    DNADS 1.0 10/05/2023             Edmund Barraza MD

## 2024-11-20 DIAGNOSIS — F90.2 ADHD (ATTENTION DEFICIT HYPERACTIVITY DISORDER), COMBINED TYPE: ICD-10-CM

## 2024-11-20 RX ORDER — DEXTROAMPHETAMINE SACCHARATE, AMPHETAMINE ASPARTATE MONOHYDRATE, DEXTROAMPHETAMINE SULFATE AND AMPHETAMINE SULFATE 5; 5; 5; 5 MG/1; MG/1; MG/1; MG/1
20 CAPSULE, EXTENDED RELEASE ORAL EVERY MORNING
Qty: 30 CAPSULE | Refills: 0 | Status: SHIPPED | OUTPATIENT
Start: 2024-11-20

## 2024-11-21 LAB — G6PD RBC-CCNT: 11.5 U/G HB (ref 9.9–16.6)

## 2024-11-25 DIAGNOSIS — M05.9 SEROPOSITIVE RHEUMATOID ARTHRITIS: ICD-10-CM

## 2024-11-25 RX ORDER — ADALIMUMAB 40MG/0.4ML
KIT SUBCUTANEOUS
Qty: 2 EACH | Refills: 2 | Status: SHIPPED | OUTPATIENT
Start: 2024-11-25

## 2024-12-02 DIAGNOSIS — M05.9 SEROPOSITIVE RHEUMATOID ARTHRITIS: ICD-10-CM

## 2024-12-02 RX ORDER — METHOTREXATE 2.5 MG/1
TABLET ORAL
Qty: 96 TABLET | Refills: 0 | Status: SHIPPED | OUTPATIENT
Start: 2024-12-02

## 2025-01-03 DIAGNOSIS — F90.2 ADHD (ATTENTION DEFICIT HYPERACTIVITY DISORDER), COMBINED TYPE: ICD-10-CM

## 2025-01-03 RX ORDER — DEXTROAMPHETAMINE SACCHARATE, AMPHETAMINE ASPARTATE MONOHYDRATE, DEXTROAMPHETAMINE SULFATE AND AMPHETAMINE SULFATE 5; 5; 5; 5 MG/1; MG/1; MG/1; MG/1
20 CAPSULE, EXTENDED RELEASE ORAL EVERY MORNING
Qty: 30 CAPSULE | Refills: 0 | Status: SHIPPED | OUTPATIENT
Start: 2025-01-03

## 2025-01-14 NOTE — PROGRESS NOTES
[facility]  New Visit Note        HISTORY OF PRESENT ILLNESS     The patient's referring provider is: Kristi Montes PA-C    HISTORY OF PRESENT ILLNESS   Andrwe Valencia is a 49 y.o. male who presents to a [facility] for a sleep medicine evaluation with concerns of Consult, Sleep Apnea, Snoring, Unrefreshing Sleep, Excessive Daytime Sleepiness, and Difficulties Staying Asleep.     AST SLEEP HISTORY    The patient had a home sleep study on July 3, 2024, which was consistent with severe obstructive sleep apnea with an apnea-hypopnea index (AHI) of 74.5. The patient's oxygen saturation was below 88% for 49 minutes.    He has tried CPAP therapy but experienced issues with pressure settings and mask fit, leading to discomfort and feelings of suffocation.  He unfortunately had to return the machine and would like to start process to restart CPAP.    CURRENT HISTORY    The patient reports symptoms of snoring, unrefreshing sleep, excessive daytime sleepiness, and difficulty falling asleep due to restlessness and arthritis pain. He also experiences difficulty staying asleep, typically waking up 2 to 3 times per night. The patient reports vivid dreams.    He has difficulty falling asleep sometimes due to restlessness or arthritis pain.  This is not very frequent.    He used an over the nose mask and thinks he may benefit from a fullface mask.  Also thinks under the nose mask like an N30 or N30 I looks more comfortable.    here is no known family history of sleep apnea.    STOP: 3 (snoring, witnessed apnea, feeling sleepy, hypertension)  BANG: 3 (obesity, neck, male)    SLEEP RELATED-ROS:    SLEEP SCHEDULE WEEKDAYS/WORKDAYS:  - Usual Bedtime: 11 PM  - Falls asleep around: Between 12 and 2 AM  - Wake time: 6 AM    SLEEP SCHEDULE WEEKENDS/NON-WORKDAYS:  - Usual Bedtime: 12 AM  - Falls asleep around: Between 12 and 2 AM  - Wake time: 7 AM    NAPS: Does not  "nap    AVERAGE SLEEP DURATION: 4 hours/day    PREFERRED SLEEP POSITION: Back, side    SLEEP INITIATION: Difficulty falling asleep due to restlessness and arthritis pain.    SLEEP MAINTENANCE: Wakes up 2-3 times per night.    RECREATIONAL DRUG USE  SMOKING: Quit in 2023  ALCOHOL: Uses alcohol about four times per week  CAFFEINE: Drinks caffeine occasionally  MARIJUANA: Used in the past, not currently using    ESS: 9      PHYSICAL EXAM     VITAL SIGNS: BP (!) 154/95   Pulse 86   Ht 1.829 m (6')   Wt 125 kg (275 lb)   SpO2 97%   BMI 37.30 kg/m²      PREVIOUS WEIGHTS:  Wt Readings from Last 3 Encounters:   01/16/25 125 kg (275 lb)   11/18/24 127 kg (280 lb)   11/15/24 128 kg (282 lb)       PHYSICAL EXAM: GENERAL: alert oriented x 3 pleasant and cooperative no acute distress  MODIFIED MALLAMPATI SCORE: 3+  LATERAL PHARYNGEAL WALL: 2+  NECK EXAM: normal supple no adenopathy    RESULTS/DATA     No results found for: \"IRON\", \"TRANSFERRIN\", \"IRONSAT\", \"TIBC\", \"FERRITIN\"    ASSESSMENT/PLAN     Mr. Valencia is a 49 y.o. male and was referred to the [facility] for the following issues:    OBSTRUCTIVE SLEEP APNEA / SLEEPINESS/ FREQUENT WAKING  -Ordering sleep study to evaluate    BMI>35  -Body mass index is 37.3 kg/m².  today  -With sufficient weight loss may no longer require treatment for CARLO    ELEVATED BP  BP Readings from Last 3 Encounters:   01/16/25 (!) 154/95   11/18/24 (!) 143/97   11/15/24 132/78      -Elevated recent appointments  -will likely benefit from CPAP therapy    Followup 3 weeks after sleep study to review results        "

## 2025-01-16 ENCOUNTER — OFFICE VISIT (OUTPATIENT)
Dept: SLEEP MEDICINE | Facility: CLINIC | Age: 50
End: 2025-01-16
Payer: COMMERCIAL

## 2025-01-16 VITALS
BODY MASS INDEX: 37.25 KG/M2 | HEIGHT: 72 IN | HEART RATE: 86 BPM | OXYGEN SATURATION: 97 % | DIASTOLIC BLOOD PRESSURE: 95 MMHG | WEIGHT: 275 LBS | SYSTOLIC BLOOD PRESSURE: 154 MMHG

## 2025-01-16 DIAGNOSIS — G47.19 EXCESSIVE DAYTIME SLEEPINESS: Primary | ICD-10-CM

## 2025-01-16 DIAGNOSIS — R03.0 ELEVATED BLOOD PRESSURE, SITUATIONAL: ICD-10-CM

## 2025-01-16 DIAGNOSIS — Z78.9 INTOLERANCE OF CONTINUOUS POSITIVE AIRWAY PRESSURE (CPAP) VENTILATION: ICD-10-CM

## 2025-01-16 DIAGNOSIS — G47.33 OSA (OBSTRUCTIVE SLEEP APNEA): ICD-10-CM

## 2025-01-16 DIAGNOSIS — G47.00 PERSISTENT DISORDER OF INITIATING OR MAINTAINING SLEEP: ICD-10-CM

## 2025-01-16 PROCEDURE — 99214 OFFICE O/P EST MOD 30 MIN: CPT | Performed by: PHYSICIAN ASSISTANT

## 2025-01-16 ASSESSMENT — PATIENT HEALTH QUESTIONNAIRE - PHQ9
1. LITTLE INTEREST OR PLEASURE IN DOING THINGS: NOT AT ALL
SUM OF ALL RESPONSES TO PHQ9 QUESTIONS 1 & 2: 0
2. FEELING DOWN, DEPRESSED OR HOPELESS: NOT AT ALL

## 2025-01-16 ASSESSMENT — SLEEP AND FATIGUE QUESTIONNAIRES
HOW LIKELY ARE YOU TO NOD OFF OR FALL ASLEEP WHILE SITTING AND TALKING TO SOMEONE: SLIGHT CHANCE OF DOZING
SITING INACTIVE IN A PUBLIC PLACE LIKE A CLASS ROOM OR A MOVIE THEATER: SLIGHT CHANCE OF DOZING
HOW LIKELY ARE YOU TO NOD OFF OR FALL ASLEEP WHILE WATCHING TV: MODERATE CHANCE OF DOZING
HOW LIKELY ARE YOU TO NOD OFF OR FALL ASLEEP WHEN YOU ARE A PASSENGER IN A CAR FOR AN HOUR WITHOUT A BREAK: SLIGHT CHANCE OF DOZING
ESS-CHAD TOTAL SCORE: 9
HOW LIKELY ARE YOU TO NOD OFF OR FALL ASLEEP IN A CAR, WHILE STOPPED FOR A FEW MINUTES IN TRAFFIC: WOULD NEVER DOZE
HOW LIKELY ARE YOU TO NOD OFF OR FALL ASLEEP WHILE LYING DOWN TO REST IN THE AFTERNOON WHEN CIRCUMSTANCES PERMIT: SLIGHT CHANCE OF DOZING
HOW LIKELY ARE YOU TO NOD OFF OR FALL ASLEEP WHILE SITTING QUIETLY AFTER LUNCH WITHOUT ALCOHOL: SLIGHT CHANCE OF DOZING
HOW LIKELY ARE YOU TO NOD OFF OR FALL ASLEEP WHILE SITTING AND READING: MODERATE CHANCE OF DOZING

## 2025-01-16 ASSESSMENT — LIFESTYLE VARIABLES
HOW MANY STANDARD DRINKS CONTAINING ALCOHOL DO YOU HAVE ON A TYPICAL DAY: 3 OR 4
HOW OFTEN DO YOU HAVE SIX OR MORE DRINKS ON ONE OCCASION: MONTHLY
AUDIT-C TOTAL SCORE: 6
SKIP TO QUESTIONS 9-10: 0
HOW OFTEN DO YOU HAVE A DRINK CONTAINING ALCOHOL: 2-3 TIMES A WEEK

## 2025-01-16 ASSESSMENT — PAIN SCALES - GENERAL: PAINLEVEL_OUTOF10: 5

## 2025-01-16 NOTE — PATIENT INSTRUCTIONS
Thank you for coming to the Sleep Medicine Clinic today! Your sleep medicine provider today was: Oli Castillo PA-C Below is a summary of your treatment plan, other important information, and our contact numbers:      TREATMENT PLAN     Call [phone]), option 3 to schedule your sleep study. When you have an appointment please call us back at [phone] to schedule a followup appointment 3-4 weeks after to review results.    Obstructive Sleep Apnea (CARLO) is a sleep disorder where your upper airway muscles relax during sleep and the airway intermittently and repetitively narrows and collapses leading to partially blocked airway (hypopnea) or completely blocked airway (apnea) which, in turn, can disrupt breathing in sleep, lower oxygen levels while you sleep and cause night time wakings. Because both apnea and hypopnea may cause higher carbon dioxide or low oxygen levels, untreated CARLO can lead to heart arrhythmia, elevation of blood pressure, and make it harder for the body to consolidate memory and facilitate metabolism (leading to higher blood sugars at night). Frequent partial arousals occur during sleep resulting in sleep deprivation and daytime sleepiness. CARLO is associated with an increased risk of cardiovascular disease, stroke, hypertension, and insulin resistance. Moreover, untreated CARLO with excessive daytime sleepiness can increase the risk of motor vehicular accidents.    Some conservative strategies for CARLO regardless of CARLO severity are:   Positional therapy - Avoid sleeping on your back.   Healthy diet and regular exercise to optimize weight is highly encouraged.   Avoid alcohol late in the evening and sedative-hypnotics as these substances can make sleep apnea worse.   Improve breathing through the nose with intranasal steroid spray, saline rinse, or antihistamines    Safety: Avoid driving vehicle and operating heavy equipment while sleepy. Drowsy driving may lead to life-threatening  motor vehicle accidents. A person driving while sleepy is 5 times more likely to have an accident. If you feel sleepy, pull over and take a short power nap (sleep for less than 30 minutes). Otherwise, ask somebody to drive you.    Treatment options for sleep apnea include weight management, positional therapy, Positive Airway Therapy (PAP) therapy, oral appliance therapy, hypoglossal nerve stimulator (Inspire) and select airway surgeries.      OUR SLEEP TESTING LOCATIONS     Our team will contact you to schedule your sleep study, however, you can contact us as follow:  Main Phone Line (scheduling only): [phone]), option 3  Adult and Pediatric Locations  [facility] (6 years and older): [facility] - 4th floor ([address] [facility]) After hours line: [phone]  [facility] (Main campus: All ages): [facility], 6th floor. After hours line: [phone]   [place] (18 years and older): [address] [facility], 2nd floor   [place] (18 years and older): [address] [facility] [address]; 4th floor  After hours line: [phone]  [facility] (18 years and older) at [place]: [address] [facility]  After hours line: [phone]    [place] (5 years and older; younger considered on case-by-case basis): [address] [facility]; [address], [address]. Scheduling  After hours line: [phone]   [place] (6 years and older): [address]; [address]   [place] (6 years and older): [address] [facility], [address]  After hours line: [phone]   [place] (13 years and older) in [place]: [address], 2nd floor  After hours line: [phone]   [place] (13 year and older): [address]  After hours line: [phone]      IMPORTANT PHONE NUMBERS     Sleep Medicine Clinic Fax: [phone]  Appointments (for Adult Sleep Clinic): [phone] - option 2  Appointments (For Sleep Studies):  "[phone] - option 3  Behavioral Sleep Medicine: [phone]    [employer] ([employer]): [phone]  For clinical questions and refilling prescriptions: [phone]  Linda Ronquillo (For [place]): P: [phone]  F: [phone]       CONTACTING YOUR SLEEP MEDICINE PROVIDER     Send a message directly to your provider through \"My Chart\", which is the email service through your  Records Account: [email or web address]   Call [phone] and leave a message. One of the administrative assistants will forward the message to your sleep medicine provider through \"My Chart\" and/or email.     Your sleep medicine provider for this visit was: Oli Castillo PA-C  "

## 2025-01-19 DIAGNOSIS — N52.9 VASCULOGENIC ERECTILE DYSFUNCTION, UNSPECIFIED VASCULOGENIC ERECTILE DYSFUNCTION TYPE: ICD-10-CM

## 2025-01-20 RX ORDER — SILDENAFIL 100 MG/1
100 TABLET, FILM COATED ORAL AS NEEDED
Qty: 9 TABLET | Refills: 11 | Status: SHIPPED | OUTPATIENT
Start: 2025-01-20

## 2025-02-07 DIAGNOSIS — M1A.00X0 IDIOPATHIC CHRONIC GOUT WITHOUT TOPHUS, UNSPECIFIED SITE: ICD-10-CM

## 2025-02-07 DIAGNOSIS — F90.2 ADHD (ATTENTION DEFICIT HYPERACTIVITY DISORDER), COMBINED TYPE: ICD-10-CM

## 2025-02-07 RX ORDER — DEXTROAMPHETAMINE SACCHARATE, AMPHETAMINE ASPARTATE MONOHYDRATE, DEXTROAMPHETAMINE SULFATE AND AMPHETAMINE SULFATE 5; 5; 5; 5 MG/1; MG/1; MG/1; MG/1
20 CAPSULE, EXTENDED RELEASE ORAL EVERY MORNING
Qty: 30 CAPSULE | Refills: 0 | Status: SHIPPED | OUTPATIENT
Start: 2025-02-07

## 2025-02-07 RX ORDER — ALLOPURINOL 300 MG/1
300 TABLET ORAL DAILY
Qty: 90 TABLET | Refills: 0 | Status: SHIPPED | OUTPATIENT
Start: 2025-02-07

## 2025-02-10 DIAGNOSIS — M05.9 SEROPOSITIVE RHEUMATOID ARTHRITIS: ICD-10-CM

## 2025-02-10 RX ORDER — SULFASALAZINE 500 MG/1
500 TABLET ORAL 2 TIMES DAILY
Qty: 120 TABLET | Refills: 0 | Status: SHIPPED | OUTPATIENT
Start: 2025-02-10

## 2025-02-11 DIAGNOSIS — K21.9 GASTROESOPHAGEAL REFLUX DISEASE WITHOUT ESOPHAGITIS: ICD-10-CM

## 2025-02-11 RX ORDER — OMEPRAZOLE 40 MG/1
40 CAPSULE, DELAYED RELEASE ORAL DAILY
Qty: 90 CAPSULE | Refills: 1 | Status: SHIPPED | OUTPATIENT
Start: 2025-02-11

## 2025-02-18 ENCOUNTER — OFFICE VISIT (OUTPATIENT)
Dept: RHEUMATOLOGY | Facility: CLINIC | Age: 50
End: 2025-02-18
Payer: COMMERCIAL

## 2025-02-18 ENCOUNTER — PROCEDURE VISIT (OUTPATIENT)
Dept: SLEEP MEDICINE | Facility: CLINIC | Age: 50
End: 2025-02-18
Payer: COMMERCIAL

## 2025-02-18 ENCOUNTER — APPOINTMENT (OUTPATIENT)
Dept: RHEUMATOLOGY | Facility: CLINIC | Age: 50
End: 2025-02-18
Payer: COMMERCIAL

## 2025-02-18 VITALS
DIASTOLIC BLOOD PRESSURE: 90 MMHG | RESPIRATION RATE: 15 BRPM | SYSTOLIC BLOOD PRESSURE: 150 MMHG | BODY MASS INDEX: 37.92 KG/M2 | OXYGEN SATURATION: 97 % | WEIGHT: 279.98 LBS | HEART RATE: 91 BPM | HEIGHT: 72 IN

## 2025-02-18 VITALS
SYSTOLIC BLOOD PRESSURE: 150 MMHG | HEIGHT: 72 IN | DIASTOLIC BLOOD PRESSURE: 90 MMHG | HEART RATE: 108 BPM | WEIGHT: 279 LBS | BODY MASS INDEX: 37.79 KG/M2

## 2025-02-18 DIAGNOSIS — R76.8 ANA POSITIVE: ICD-10-CM

## 2025-02-18 DIAGNOSIS — M05.9 SEROPOSITIVE RHEUMATOID ARTHRITIS: Primary | ICD-10-CM

## 2025-02-18 DIAGNOSIS — G47.33 OSA (OBSTRUCTIVE SLEEP APNEA): ICD-10-CM

## 2025-02-18 PROCEDURE — 3077F SYST BP >= 140 MM HG: CPT | Performed by: INTERNAL MEDICINE

## 2025-02-18 PROCEDURE — 3080F DIAST BP >= 90 MM HG: CPT | Performed by: INTERNAL MEDICINE

## 2025-02-18 PROCEDURE — 1036F TOBACCO NON-USER: CPT | Performed by: INTERNAL MEDICINE

## 2025-02-18 PROCEDURE — 99213 OFFICE O/P EST LOW 20 MIN: CPT | Performed by: INTERNAL MEDICINE

## 2025-02-18 PROCEDURE — 3008F BODY MASS INDEX DOCD: CPT | Performed by: INTERNAL MEDICINE

## 2025-02-18 ASSESSMENT — PAIN SCALES - GENERAL: PAINLEVEL_OUTOF10: 4

## 2025-02-18 ASSESSMENT — SLEEP AND FATIGUE QUESTIONNAIRES
HOW LIKELY ARE YOU TO NOD OFF OR FALL ASLEEP WHILE SITTING QUIETLY AFTER LUNCH WITHOUT ALCOHOL: SLIGHT CHANCE OF DOZING
HOW LIKELY ARE YOU TO NOD OFF OR FALL ASLEEP WHILE SITTING AND TALKING TO SOMEONE: SLIGHT CHANCE OF DOZING
HOW LIKELY ARE YOU TO NOD OFF OR FALL ASLEEP WHILE SITTING AND READING: SLIGHT CHANCE OF DOZING
HOW LIKELY ARE YOU TO NOD OFF OR FALL ASLEEP WHEN YOU ARE A PASSENGER IN A CAR FOR AN HOUR WITHOUT A BREAK: SLIGHT CHANCE OF DOZING
HOW LIKELY ARE YOU TO NOD OFF OR FALL ASLEEP IN A CAR, WHILE STOPPED FOR A FEW MINUTES IN TRAFFIC: WOULD NEVER DOZE
HOW LIKELY ARE YOU TO NOD OFF OR FALL ASLEEP WHILE LYING DOWN TO REST IN THE AFTERNOON WHEN CIRCUMSTANCES PERMIT: SLIGHT CHANCE OF DOZING
ESS-CHAD TOTAL SCORE: 6
HOW LIKELY ARE YOU TO NOD OFF OR FALL ASLEEP WHILE WATCHING TV: SLIGHT CHANCE OF DOZING
SITING INACTIVE IN A PUBLIC PLACE LIKE A CLASS ROOM OR A MOVIE THEATER: WOULD NEVER DOZE

## 2025-02-18 NOTE — PROGRESS NOTES
Subjective . Andrew Valencia is a 49 y.o. male who presents for Follow-up (3 month follow up).    HPI. 49-year-old male with history of seropositive RA, positive CHRIS, gout, GERD, class II obesity, ADHD, major depression presented for follow-up.     Patient states he is feeling all right.    Immunosuppression: SSZ (11/2024), methotrexate (10/2023), Humira (3/2024).      Past immunosuppression:   Prednisone.   Hydroxychloroquine. (10/2023- 11/2024)    Review of Systems   All other systems reviewed and are negative.    Objective     Blood pressure 150/90, pulse 108, height 1.829 m (6'), weight 127 kg (279 lb).    Physical Exam.  Gen. AAO x3, NAD.  HEENT: No pallor or icterus, PERRLA, EOMI. Parotid glands  not enlarged. No cervical lymphadenopathy .  Skin: No rashes.  Heart: S1, S2/ RRR.   Lungs: CTA B.  Abdomen: Soft, NT/ND.  MSK: No swollen or tender joint.  Neuro: Sensation to touch intact.Strength 5/5 throughout.   Psych:Appropriate mood and behavior  EXT: No edema      Assessment/Plan       #1: Seropositive RA.  Stable.  -Continue adalimumab injection every other week.  -Continue methotrexate and sulfasalazine.  -Routine labs.    #2: Positive CHRIS.    Follow-up in 4 months.     This note was partially generated using the Dragon Voice recognition system. There may be some incorrect wording, spelling and/or spelling errors or punctuation errors that were not corrected prior to committing the note to the medical record.      Problem List Items Addressed This Visit    None  Visit Diagnoses       Seropositive rheumatoid arthritis    -  Primary    Relevant Orders    CBC    C-Reactive Protein    Hepatic Function Panel    Sedimentation Rate                 Active Ambulatory Problems     Diagnosis Date Noted    ADHD (attention deficit hyperactivity disorder), combined type 08/03/2023    Elevated blood pressure, situational 08/03/2023    Gastroesophageal reflux disease 08/03/2023    Hypertriglyceridemia 08/03/2023    Acute  gout involving toe of right foot 08/03/2023    Internal hemorrhoids 08/03/2023    Major depressive disorder, recurrent episode, moderate with anxious distress (Multi) 08/03/2023    Class 2 severe obesity due to excess calories with serious comorbidity and body mass index (BMI) of 36.0 to 36.9 in adult 10/15/2023    On stimulant medication 10/15/2023    Rheumatoid arthritis of multiple sites with negative rheumatoid factor (Multi) 04/26/2024    Psoriatic arthritis (Multi) 04/26/2024    Obstructive sleep apnea syndrome 07/10/2024     Resolved Ambulatory Problems     Diagnosis Date Noted    Acute medial meniscus tear of right knee 08/03/2023    Acute pain of right knee 08/03/2023    Acute right ankle pain 08/03/2023    Internal derangement of right knee 08/03/2023    Sprain of medial collateral ligament of right knee 08/03/2023    Pressure injury of left hip, stage 1 08/03/2023    Spasm 08/03/2023     Past Medical History:   Diagnosis Date    Cellulitis of left lower limb 11/16/2019    Cramp and spasm 09/06/2017    Other acute sinusitis 09/28/2018    Personal history of other diseases of the respiratory system 09/29/2018    Personal history of other specified conditions 03/13/2019    Personal history of other specified conditions 01/25/2021    Rash and other nonspecific skin eruption 01/25/2021    Strain of muscle and tendon of unspecified wall of thorax, initial encounter 03/24/2018       No family history on file.    Past Surgical History:   Procedure Laterality Date    APPENDECTOMY  09/06/2017    Appendectomy       Social History     Tobacco Use   Smoking Status Former    Current packs/day: 1.50    Average packs/day: 1.5 packs/day for 2.1 years (3.2 ttl pk-yrs)    Types: Cigarettes    Start date: 2023    Quit date: 1993    Passive exposure: Current (PAST AND CURRENT)   Smokeless Tobacco Never       Allergies  Bupropion, Escitalopram, and Paroxetine    Current Meds  Current Outpatient Medications   Medication  Instructions    allopurinol (ZYLOPRIM) 300 mg, oral, Daily    amphetamine-dextroamphetamine XR (Adderall XR) 20 mg 24 hr capsule 20 mg, oral, Every morning    co-enzyme Q-10 30 mg, Every other day    colchicine 0.6 mg, Daily    cyanocobalamin (VITAMIN B-12) 1,000 mcg, oral, Daily    folic acid (FOLVITE) 1 mg, oral, Daily    Humira,CF, Pen 40 mg/0.4 mL pen injector kit pen-injector GIVE ONE INJECTION (40MG) SUBCUTANEOUSLY ONCE EVERY 2 WEEKS.    methotrexate (Trexall) 2.5 mg tablet TAKE 8 TABLETS BY MOUTH ONCE A WEEK AS DIRECTED (FOLLOW  DIRECTIONS  GIVEN  BY  MD)    omeprazole (PRILOSEC) 40 mg, oral, Daily    sildenafil (VIAGRA) 100 mg, oral, As needed    sulfaSALAzine (AZULFIDINE) 500 mg, oral, 2 times daily    thiamine (VITAMIN B-1) 100 mg, oral, Daily        Lab Results   Component Value Date    ANATITER 1:320 10/05/2023    C3 161 10/17/2023    C4 24 10/17/2023    RF 61 (H) 10/05/2023    SEDRATE 18 (H) 11/18/2024    CRP 0.23 11/18/2024    URICACID 8.7 (H) 06/18/2024    DNADS 1.0 10/05/2023             Edmund Barraza MD

## 2025-02-19 LAB
ALBUMIN SERPL-MCNC: 4.6 G/DL (ref 3.6–5.1)
ALBUMIN/GLOB SERPL: 1.8 (CALC) (ref 1–2.5)
ALP SERPL-CCNC: 44 U/L (ref 36–130)
ALT SERPL-CCNC: 53 U/L (ref 9–46)
AST SERPL-CCNC: 34 U/L (ref 10–40)
BILIRUB DIRECT SERPL-MCNC: 0.1 MG/DL
BILIRUB INDIRECT SERPL-MCNC: 0.3 MG/DL (CALC) (ref 0.2–1.2)
BILIRUB SERPL-MCNC: 0.4 MG/DL (ref 0.2–1.2)
CRP SERPL-MCNC: <3 MG/L
ERYTHROCYTE [DISTWIDTH] IN BLOOD BY AUTOMATED COUNT: 13 % (ref 11–15)
ERYTHROCYTE [SEDIMENTATION RATE] IN BLOOD BY WESTERGREN METHOD: 9 MM/H
GLOBULIN SER CALC-MCNC: 2.5 G/DL (CALC) (ref 1.9–3.7)
HCT VFR BLD AUTO: 41.4 % (ref 38.5–50)
HGB BLD-MCNC: 13.9 G/DL (ref 13.2–17.1)
MCH RBC QN AUTO: 30.8 PG (ref 27–33)
MCHC RBC AUTO-ENTMCNC: 33.6 G/DL (ref 32–36)
MCV RBC AUTO: 91.6 FL (ref 80–100)
PLATELET # BLD AUTO: 146 THOUSAND/UL (ref 140–400)
PMV BLD REES-ECKER: 11.1 FL (ref 7.5–12.5)
PROT SERPL-MCNC: 7.1 G/DL (ref 6.1–8.1)
RBC # BLD AUTO: 4.52 MILLION/UL (ref 4.2–5.8)
WBC # BLD AUTO: 2.9 THOUSAND/UL (ref 3.8–10.8)

## 2025-02-19 NOTE — PROGRESS NOTES
[facility] TECH NOTE:     Patient: Andrew Valencia   MRN//AGE: [id number]  1975  49 y.o.   Technologist: Tez Perez   Room: 3   Service Date: 2025        Sleep Testing Location: [facility]  Neck Cir 48 cm  Dema: 6    TECHNOLOGIST SLEEP STUDY PROCEDURE NOTE:   This sleep study is being conducted according to the policies and procedures outlined by the AAS accreditation standards.  The sleep study procedure and processes involved during this appointment was explained to the patient/patient’s family, questions were answered. The patient/family verbalized understanding.      The patient is a 49 y.o. year old male scheduled for a Diagnostic PSG Split night  he arrived for his appointment.      The study that was ultimately completed was a Diagnostic PSG Split night .    The full study Was completed.  Patient questionnaires completed?: yes   Consents signed? yes      Initial Fall Risk Screening:     Andrew has not fallen in the last 6 months. Andrew does not have a fear of falling. He does not need assistance with sitting, standing, or walking. he does not need assistance walking in his home. he does not need assistance in an unfamiliar setting. The patient is notusing an assistive device.     Brief Study observations: This is a 49 year old male here for a PSG-Split Night Study. The patent had a HST performed here on 2024 and was dx'ed with CARLO. His AHI was 74.5., CPAP was initiated at 0134 EP#407.  The patient did meet split-Night criteria AHI>10 in at least 2 hours of sleep prior [id number]. Bi-PAP was initiated at 0326 EP#630. Bi-PAP was applied due to frequent arousals.    Deviation to order/protocol and reason: None      If PAP, which was preferred mask/pressure/mode: Boykin Cinda Alvarado full face mask size larger       Other:None    After the procedure, the patient/family was informed to ensure followup with ordering clinician for testing results.      Technologist: Tez Perez

## 2025-02-26 DIAGNOSIS — G47.33 OSA (OBSTRUCTIVE SLEEP APNEA): Primary | ICD-10-CM

## 2025-03-04 DIAGNOSIS — M05.9 SEROPOSITIVE RHEUMATOID ARTHRITIS: ICD-10-CM

## 2025-03-04 RX ORDER — ADALIMUMAB 40MG/0.4ML
KIT SUBCUTANEOUS
Qty: 2 EACH | Refills: 2 | Status: SHIPPED | OUTPATIENT
Start: 2025-03-04

## 2025-03-05 DIAGNOSIS — M05.9 SEROPOSITIVE RHEUMATOID ARTHRITIS: ICD-10-CM

## 2025-03-05 RX ORDER — METHOTREXATE 2.5 MG/1
TABLET ORAL
Qty: 96 TABLET | Refills: 0 | Status: SHIPPED | OUTPATIENT
Start: 2025-03-05

## 2025-03-19 ENCOUNTER — APPOINTMENT (OUTPATIENT)
Dept: SLEEP MEDICINE | Facility: CLINIC | Age: 50
End: 2025-03-19
Payer: COMMERCIAL

## 2025-03-19 VITALS
SYSTOLIC BLOOD PRESSURE: 147 MMHG | WEIGHT: 275 LBS | HEART RATE: 109 BPM | HEIGHT: 72 IN | BODY MASS INDEX: 37.25 KG/M2 | OXYGEN SATURATION: 96 % | DIASTOLIC BLOOD PRESSURE: 80 MMHG

## 2025-03-19 DIAGNOSIS — G47.33 OSA (OBSTRUCTIVE SLEEP APNEA): Primary | ICD-10-CM

## 2025-03-19 DIAGNOSIS — G47.00 PERSISTENT DISORDER OF INITIATING OR MAINTAINING SLEEP: ICD-10-CM

## 2025-03-19 DIAGNOSIS — R03.0 ELEVATED BLOOD PRESSURE, SITUATIONAL: ICD-10-CM

## 2025-03-19 DIAGNOSIS — G47.19 EXCESSIVE DAYTIME SLEEPINESS: ICD-10-CM

## 2025-03-19 DIAGNOSIS — Z78.9 INTOLERANCE OF CONTINUOUS POSITIVE AIRWAY PRESSURE (CPAP) VENTILATION: ICD-10-CM

## 2025-03-19 DIAGNOSIS — F90.2 ADHD (ATTENTION DEFICIT HYPERACTIVITY DISORDER), COMBINED TYPE: ICD-10-CM

## 2025-03-19 PROCEDURE — 3079F DIAST BP 80-89 MM HG: CPT | Performed by: PHYSICIAN ASSISTANT

## 2025-03-19 PROCEDURE — 3008F BODY MASS INDEX DOCD: CPT | Performed by: PHYSICIAN ASSISTANT

## 2025-03-19 PROCEDURE — 99213 OFFICE O/P EST LOW 20 MIN: CPT | Performed by: PHYSICIAN ASSISTANT

## 2025-03-19 PROCEDURE — 1036F TOBACCO NON-USER: CPT | Performed by: PHYSICIAN ASSISTANT

## 2025-03-19 PROCEDURE — 3077F SYST BP >= 140 MM HG: CPT | Performed by: PHYSICIAN ASSISTANT

## 2025-03-19 RX ORDER — DEXTROAMPHETAMINE SACCHARATE, AMPHETAMINE ASPARTATE MONOHYDRATE, DEXTROAMPHETAMINE SULFATE AND AMPHETAMINE SULFATE 5; 5; 5; 5 MG/1; MG/1; MG/1; MG/1
20 CAPSULE, EXTENDED RELEASE ORAL EVERY MORNING
Qty: 30 CAPSULE | Refills: 0 | Status: SHIPPED | OUTPATIENT
Start: 2025-03-19

## 2025-03-19 ASSESSMENT — SLEEP AND FATIGUE QUESTIONNAIRES
HOW LIKELY ARE YOU TO NOD OFF OR FALL ASLEEP WHILE SITTING AND READING: MODERATE CHANCE OF DOZING
SITING INACTIVE IN A PUBLIC PLACE LIKE A CLASS ROOM OR A MOVIE THEATER: MODERATE CHANCE OF DOZING
HOW LIKELY ARE YOU TO NOD OFF OR FALL ASLEEP WHILE SITTING AND TALKING TO SOMEONE: MODERATE CHANCE OF DOZING
HOW LIKELY ARE YOU TO NOD OFF OR FALL ASLEEP WHILE SITTING QUIETLY AFTER LUNCH WITHOUT ALCOHOL: MODERATE CHANCE OF DOZING
HOW LIKELY ARE YOU TO NOD OFF OR FALL ASLEEP WHILE WATCHING TV: MODERATE CHANCE OF DOZING
HOW LIKELY ARE YOU TO NOD OFF OR FALL ASLEEP WHEN YOU ARE A PASSENGER IN A CAR FOR AN HOUR WITHOUT A BREAK: MODERATE CHANCE OF DOZING
ESS-CHAD TOTAL SCORE: 15
HOW LIKELY ARE YOU TO NOD OFF OR FALL ASLEEP WHILE LYING DOWN TO REST IN THE AFTERNOON WHEN CIRCUMSTANCES PERMIT: HIGH CHANCE OF DOZING
HOW LIKELY ARE YOU TO NOD OFF OR FALL ASLEEP IN A CAR, WHILE STOPPED FOR A FEW MINUTES IN TRAFFIC: WOULD NEVER DOZE

## 2025-03-19 ASSESSMENT — PAIN SCALES - GENERAL: PAINLEVEL_OUTOF10: 0-NO PAIN

## 2025-03-19 NOTE — PROGRESS NOTES
"[facility]  Followup Visit Note    HISTORY OF PRESENT ILLNESS   Andrew aVlencia is a 49 y.o. male who presents to a [facility] for followup.       Current History    Sleep study was consistent with severe CARLO with AHI of 47.8 and spo2<88% for 7.2 minutes. He was placed on CPAP and changed to BiPAP and recommended to start at a pressure of 17/12 cm H2O.    He is open to BiPAP for symptoms including frequent waking, snoring, and excessive sleepiness.    Could not tolerate CPAP in past due to pressure.    Sleep Scales:  ESS: 6     REVIEW OF SYSTEMS    All other systems negative      PHYSICAL EXAM     VITAL SIGNS: There were no vitals taken for this visit.     PREVIOUS WEIGHTS:  Wt Readings from Last 3 Encounters:   02/18/25 127 kg (279 lb 15.8 oz)   02/18/25 127 kg (279 lb)   01/16/25 125 kg (275 lb)       Constitutional: Alert and oriented, cooperative, no obvious distress   HEENT: Non icteric or anemic, EOM WNL bilaterally   Neck: Supple, no JVD, no goiter, no adenopathy, no rigidity  Extremities: No clubbing, no LL edema   Neuromuscular: Cranial nerves grossly intact, no focal deficits     RESULTS/DATA     No results found for: \"IRON\", \"TRANSFERRIN\", \"IRONSAT\", \"TIBC\", \"FERRITIN\"      ASSESSMENT/PLAN     Mr. Valencia is a 49 y.o. male and returns in followup for the following issues:    OBSTRUCTIVE SLEEP APNEA / SLEEPINESS / FREQUENT WAKING / CPAP INTOLERANCE IN PAST  -Reviewed test results  -BiPAP rx printed today pressure 17/12 cm H2O - he is interested in buying online  -Goal of CPAP includes less daytime sleepiness, less waking at night, better BP control    BMI>35  -Body mass index is 37.3 kg/m².  today  -With sufficient weight loss may no longer require treatment for CARLO    ELEVATED BP  BP Readings from Last 3 Encounters:   03/19/25 147/80   02/18/25 150/90   02/18/25 150/90      -would benefit from BiPAP therapy    Followup 31-90 days after " starting CPAP

## 2025-03-19 NOTE — PATIENT INSTRUCTIONS
[email or web address]&[email or web address]=search&[email or web address]&[id number]    I am printing out a prescription and the visit note from last appointment for you. This should be all they need for buying online.    Follow up in 3 months    Oli Castillo PA-C    IMPORTANT PHONE NUMBERS     [phone]  [employer] ([place]): [phone]  For clinical questions and refilling prescriptions: [phone]  Linda Ronquillo (For [place]): P: [phone]

## 2025-04-21 DIAGNOSIS — M05.9 SEROPOSITIVE RHEUMATOID ARTHRITIS: ICD-10-CM

## 2025-04-21 RX ORDER — SULFASALAZINE 500 MG/1
500 TABLET ORAL 2 TIMES DAILY
Qty: 120 TABLET | Refills: 0 | Status: SHIPPED | OUTPATIENT
Start: 2025-04-21

## 2025-04-21 NOTE — TELEPHONE ENCOUNTER
Prescription Request        Has The Patient Been Identified By Name And Date Of Birth: Yes    RX Requestor: Pharmacy    Date of Last Refill: 02/10/2025    Date Of Last Office Visit: 02/18/2025

## 2025-04-23 DIAGNOSIS — F90.2 ADHD (ATTENTION DEFICIT HYPERACTIVITY DISORDER), COMBINED TYPE: ICD-10-CM

## 2025-04-23 RX ORDER — DEXTROAMPHETAMINE SACCHARATE, AMPHETAMINE ASPARTATE MONOHYDRATE, DEXTROAMPHETAMINE SULFATE AND AMPHETAMINE SULFATE 5; 5; 5; 5 MG/1; MG/1; MG/1; MG/1
20 CAPSULE, EXTENDED RELEASE ORAL EVERY MORNING
Qty: 30 CAPSULE | Refills: 0 | Status: SHIPPED | OUTPATIENT
Start: 2025-04-23

## 2025-04-29 ENCOUNTER — APPOINTMENT (OUTPATIENT)
Dept: PRIMARY CARE | Facility: CLINIC | Age: 50
End: 2025-04-29
Payer: COMMERCIAL

## 2025-04-29 VITALS
SYSTOLIC BLOOD PRESSURE: 132 MMHG | HEIGHT: 72 IN | OXYGEN SATURATION: 98 % | HEART RATE: 91 BPM | BODY MASS INDEX: 36.98 KG/M2 | WEIGHT: 273 LBS | DIASTOLIC BLOOD PRESSURE: 70 MMHG

## 2025-04-29 DIAGNOSIS — E78.1 HYPERTRIGLYCERIDEMIA: ICD-10-CM

## 2025-04-29 DIAGNOSIS — E66.812 CLASS 2 SEVERE OBESITY DUE TO EXCESS CALORIES WITH SERIOUS COMORBIDITY AND BODY MASS INDEX (BMI) OF 37.0 TO 37.9 IN ADULT: ICD-10-CM

## 2025-04-29 DIAGNOSIS — R79.89 ELEVATED LIVER FUNCTION TESTS: ICD-10-CM

## 2025-04-29 DIAGNOSIS — F33.1 MAJOR DEPRESSIVE DISORDER, RECURRENT EPISODE, MODERATE WITH ANXIOUS DISTRESS (MULTI): ICD-10-CM

## 2025-04-29 DIAGNOSIS — Z51.81 ENCOUNTER FOR MONITORING STIMULANT THERAPY: ICD-10-CM

## 2025-04-29 DIAGNOSIS — E66.01 CLASS 2 SEVERE OBESITY DUE TO EXCESS CALORIES WITH SERIOUS COMORBIDITY AND BODY MASS INDEX (BMI) OF 37.0 TO 37.9 IN ADULT: ICD-10-CM

## 2025-04-29 DIAGNOSIS — Z79.899 ENCOUNTER FOR MONITORING STIMULANT THERAPY: ICD-10-CM

## 2025-04-29 DIAGNOSIS — M06.09 RHEUMATOID ARTHRITIS OF MULTIPLE SITES WITH NEGATIVE RHEUMATOID FACTOR (MULTI): ICD-10-CM

## 2025-04-29 DIAGNOSIS — L40.50 PSORIATIC ARTHRITIS (MULTI): ICD-10-CM

## 2025-04-29 DIAGNOSIS — Z00.00 WELL ADULT EXAM: Primary | ICD-10-CM

## 2025-04-29 DIAGNOSIS — F90.2 ADHD (ATTENTION DEFICIT HYPERACTIVITY DISORDER), COMBINED TYPE: ICD-10-CM

## 2025-04-29 PROCEDURE — 99396 PREV VISIT EST AGE 40-64: CPT | Performed by: FAMILY MEDICINE

## 2025-04-29 PROCEDURE — 3075F SYST BP GE 130 - 139MM HG: CPT | Performed by: FAMILY MEDICINE

## 2025-04-29 PROCEDURE — 3008F BODY MASS INDEX DOCD: CPT | Performed by: FAMILY MEDICINE

## 2025-04-29 PROCEDURE — 3078F DIAST BP <80 MM HG: CPT | Performed by: FAMILY MEDICINE

## 2025-04-29 PROCEDURE — 1036F TOBACCO NON-USER: CPT | Performed by: FAMILY MEDICINE

## 2025-04-29 ASSESSMENT — ENCOUNTER SYMPTOMS
HEMATURIA: 0
SHORTNESS OF BREATH: 0
FREQUENCY: 0
BRUISES/BLEEDS EASILY: 0
BLOOD IN STOOL: 0
EYE REDNESS: 0
PALPITATIONS: 0
DYSPHORIC MOOD: 0
CONSTIPATION: 0
DEPRESSION: 0
TREMORS: 0
POLYPHAGIA: 0
NERVOUS/ANXIOUS: 0
COUGH: 0
HEADACHES: 0
COLOR CHANGE: 0
FEVER: 0
TROUBLE SWALLOWING: 0
VOMITING: 0
POLYDIPSIA: 0
EYE PAIN: 0
CHEST TIGHTNESS: 0
SORE THROAT: 0
NAUSEA: 0
DIARRHEA: 0
WHEEZING: 0
ARTHRALGIAS: 1
FATIGUE: 0
ABDOMINAL PAIN: 1
OCCASIONAL FEELINGS OF UNSTEADINESS: 0
DYSURIA: 0
NUMBNESS: 0
WEAKNESS: 0
DIZZINESS: 0
BACK PAIN: 1
LOSS OF SENSATION IN FEET: 0
ADENOPATHY: 0
CHILLS: 0

## 2025-04-29 ASSESSMENT — PATIENT HEALTH QUESTIONNAIRE - PHQ9
1. LITTLE INTEREST OR PLEASURE IN DOING THINGS: NOT AT ALL
2. FEELING DOWN, DEPRESSED OR HOPELESS: NOT AT ALL
SUM OF ALL RESPONSES TO PHQ9 QUESTIONS 1 & 2: 0

## 2025-04-29 NOTE — PROGRESS NOTES
Subjective   Patient ID: Andrew Valencia is a 49 y.o. male who presents for Annual Exam (Check up ).    Patient has been feeling well overall since last visit. He is tolerating all of his medications well with no reported side effects. He remains active with no limitation. Denies any chest pain, shortness of breath, lightheadedness, dizziness, blurred vision. He recently underwent a repeat sleep study and was told he now needs a BiPAP.     His rheumatoid arthritis is generally well controlled but will flare during weather changes. He continues to follow with a rheumatologist in this regard. During his most recent visit in February, his liver enzymes were found to be elevated. He has since changed his diet and has almost stopped drinking alcohol. He reports an intermittent dull ache in his right upper quadrant of his abdomen. This has improved slightly with his lifestyle changes.        Ophtho- 2/24  Dentist- goes once a year  Colonoscopy-  CARROL-  Cologuard- 5/24  PSA-  UA/Micro-  Lung CT-  Coronary Calcium CT Score-  AAA-  EKG-  RSV-   Prevnar-  Flu-  Shingrix-  Td- 4/18  Hep C-  Advance Directives-    OARRS:  Jeff Moyer, DO on 4/29/2025  8:29 AM  I have personally reviewed the OARRS report for Andrew Valencia. I have considered the risks of abuse, dependence, addiction and diversion and I believe that it is clinically appropriate for Andrew Valencia to be prescribed this medication    Is the patient prescribed a combination of a benzodiazepine and opioid?  No    Last Urine Drug Screen / ordered today: Yes  Recent Results (from the past 8760 hours)   Drug Screen, Urine With Reflex to Confirmation    Collection Time: 11/15/24  3:01 PM   Result Value Ref Range    Amphetamine Screen, Urine Presumptive Negative Presumptive Negative    Barbiturate Screen, Urine Presumptive Negative Presumptive Negative    Benzodiazepines Screen, Urine Presumptive Negative Presumptive Negative    Cannabinoid Screen, Urine  Presumptive Negative Presumptive Negative    Cocaine Metabolite Screen, Urine Presumptive Negative Presumptive Negative    Fentanyl Screen, Urine Presumptive Negative Presumptive Negative    Opiate Screen, Urine Presumptive Negative Presumptive Negative    Oxycodone Screen, Urine Presumptive Negative Presumptive Negative    PCP Screen, Urine Presumptive Negative Presumptive Negative    Methadone Screen, Urine Presumptive Negative Presumptive Negative     Results are as expected.         Controlled Substance Agreement:  Date of the Last Agreement: 8/3/23  Reviewed Controlled Substance Agreement including but not limited to the benefits, risks, and alternatives to treatment with a Controlled Substance medication(s).    Stimulants:   What is the patient's goal of therapy? Focus at work  Is this being achieved with current treatment? Yes    Activities of Daily Living:   Is your overall impression that this patient is benefiting (symptom reduction outweighs side effects) from stimulant therapy? Yes     1. Physical Functioning: Same  2. Family Relationship: Better  3. Social Relationship: Better  4. Mood: Same  5. Sleep Patterns: Same  6. Overall Function: Same      Review of Systems   Constitutional:  Negative for chills, fatigue and fever.   HENT:  Negative for congestion, ear discharge, ear pain, hearing loss, nosebleeds, sore throat, tinnitus and trouble swallowing.    Eyes:  Negative for pain, redness and visual disturbance.   Respiratory:  Negative for cough, chest tightness, shortness of breath and wheezing.    Cardiovascular:  Negative for chest pain, palpitations and leg swelling.   Gastrointestinal:  Positive for abdominal pain. Negative for blood in stool, constipation, diarrhea, nausea and vomiting.   Endocrine: Negative for cold intolerance, heat intolerance, polydipsia, polyphagia and polyuria.   Genitourinary:  Negative for dysuria, frequency, hematuria and urgency.   Musculoskeletal:  Positive for  arthralgias and back pain. Negative for gait problem.   Skin:  Negative for color change and rash.   Neurological:  Negative for dizziness, tremors, syncope, weakness, numbness and headaches.   Hematological:  Negative for adenopathy. Does not bruise/bleed easily.   Psychiatric/Behavioral:  Negative for dysphoric mood. The patient is not nervous/anxious.        Objective   /70   Pulse 91   Ht 1.829 m (6')   Wt 124 kg (273 lb)   SpO2 98%   BMI 37.03 kg/m²     Physical Exam  Vitals and nursing note reviewed.   Constitutional:       General: He is not in acute distress.     Appearance: Normal appearance.   HENT:      Head: Normocephalic and atraumatic.      Right Ear: Tympanic membrane, ear canal and external ear normal.      Left Ear: Tympanic membrane, ear canal and external ear normal.      Nose: Nose normal.      Mouth/Throat:      Mouth: Mucous membranes are moist.      Pharynx: Oropharynx is clear.   Eyes:      Extraocular Movements: Extraocular movements intact.      Conjunctiva/sclera: Conjunctivae normal.      Pupils: Pupils are equal, round, and reactive to light.   Neck:      Vascular: No carotid bruit.   Cardiovascular:      Rate and Rhythm: Normal rate and regular rhythm.      Pulses: Normal pulses.      Heart sounds: Normal heart sounds. No murmur heard.  Pulmonary:      Effort: Pulmonary effort is normal.      Breath sounds: Normal breath sounds.   Abdominal:      General: Abdomen is flat. Bowel sounds are normal.      Palpations: Abdomen is soft. There is no mass.   Musculoskeletal:         General: Normal range of motion.      Cervical back: Normal range of motion and neck supple.   Lymphadenopathy:      Cervical: No cervical adenopathy.   Skin:     Capillary Refill: Capillary refill takes less than 2 seconds.   Neurological:      General: No focal deficit present.      Mental Status: He is alert and oriented to person, place, and time.      Cranial Nerves: No cranial nerve deficit.       Motor: No weakness.      Deep Tendon Reflexes: Reflexes normal.   Psychiatric:         Mood and Affect: Mood normal.         Behavior: Behavior normal.         Assessment/Plan   Problem List Items Addressed This Visit           ICD-10-CM    Hypertriglyceridemia - Primary E78.1    Relevant Orders    Lipid Panel    Comprehensive Metabolic Panel    ADHD (attention deficit hyperactivity disorder), combined type F90.2     Other Visit Diagnoses         Codes      Well adult exam     Z00.00      Encounter for monitoring stimulant therapy     Z51.81, Z79.899    Relevant Orders    Drug Screen, Urine With Reflex to Confirmation      Elevated liver function tests     R79.89    Relevant Orders    Hepatitis panel, acute        ADHD- adderall, CV exercise     Gout- allopurinol, avoid purines     Hyperlipidemia- TLC, check labs     MDD-  CV exercise, does not want meds at this time     Obesity- limit calories, increase CV exercise     RA/Psoriatic Arthritis- continue meds per rheumatology     GERD- omeprazole, avoid trigger foods

## 2025-05-02 LAB
ALBUMIN SERPL-MCNC: 4.4 G/DL (ref 3.6–5.1)
ALP SERPL-CCNC: 38 U/L (ref 36–130)
ALT SERPL-CCNC: 28 U/L (ref 9–46)
AMPHETAMINES UR QL: NORMAL NG/ML
ANION GAP SERPL CALCULATED.4IONS-SCNC: 10 MMOL/L (CALC) (ref 7–17)
AST SERPL-CCNC: 26 U/L (ref 10–40)
BARBITURATES UR QL: NORMAL NG/ML
BENZODIAZ UR QL: NORMAL NG/ML
BILIRUB SERPL-MCNC: 0.6 MG/DL (ref 0.2–1.2)
BUN SERPL-MCNC: 11 MG/DL (ref 7–25)
BZE UR QL: NORMAL NG/ML
CALCIUM SERPL-MCNC: 9.5 MG/DL (ref 8.6–10.3)
CHLORIDE SERPL-SCNC: 103 MMOL/L (ref 98–110)
CHOLEST SERPL-MCNC: 230 MG/DL
CHOLEST/HDLC SERPL: 4.2 (CALC)
CO2 SERPL-SCNC: 26 MMOL/L (ref 20–32)
CREAT SERPL-MCNC: 0.87 MG/DL (ref 0.6–1.29)
CREAT UR-MCNC: NORMAL MG/DL
EGFRCR SERPLBLD CKD-EPI 2021: 107 ML/MIN/1.73M2
FENTANYL UR QL SCN: NORMAL NG/ML
GLUCOSE SERPL-MCNC: 106 MG/DL (ref 65–99)
HAV IGM SERPL QL IA: NORMAL
HBV CORE IGM SERPL QL IA: NORMAL
HBV SURFACE AG SERPL QL IA: NORMAL
HCV AB SERPL QL IA: NORMAL
HDLC SERPL-MCNC: 55 MG/DL
LDLC SERPL CALC-MCNC: 142 MG/DL (CALC)
METHADONE UR QL: NORMAL NG/ML
NONHDLC SERPL-MCNC: 175 MG/DL (CALC)
OPIATES UR QL: NORMAL NG/ML
OXYCODONE UR QL: NORMAL NG/ML
PCP UR QL: NORMAL NG/ML
POTASSIUM SERPL-SCNC: 4.3 MMOL/L (ref 3.5–5.3)
PROT SERPL-MCNC: 6.8 G/DL (ref 6.1–8.1)
QUEST NOTES AND COMMENTS: NORMAL
SODIUM SERPL-SCNC: 139 MMOL/L (ref 135–146)
THC UR QL: NORMAL NG/ML
TRIGL SERPL-MCNC: 191 MG/DL

## 2025-05-20 DIAGNOSIS — M1A.00X0 IDIOPATHIC CHRONIC GOUT WITHOUT TOPHUS, UNSPECIFIED SITE: ICD-10-CM

## 2025-05-20 DIAGNOSIS — M05.9 SEROPOSITIVE RHEUMATOID ARTHRITIS: ICD-10-CM

## 2025-05-20 RX ORDER — FOLIC ACID 1 MG/1
1 TABLET ORAL DAILY
Qty: 90 TABLET | Refills: 0 | OUTPATIENT
Start: 2025-05-20

## 2025-05-20 RX ORDER — ALLOPURINOL 300 MG/1
300 TABLET ORAL DAILY
Qty: 90 TABLET | Refills: 0 | Status: SHIPPED | OUTPATIENT
Start: 2025-05-20

## 2025-05-20 NOTE — TELEPHONE ENCOUNTER
Prescription Request        Has The Patient Been Identified By Name And Date Of Birth: Yes    RX Requestor: Pharmacy    Date of Last Refill: 08/19/2024    Date Of Last Office Visit:  02/18/2025    Date Of Future Office Visit: Reached out to patient to schedule and establish care with new rheum

## 2025-05-27 DIAGNOSIS — D72.819 LEUKOPENIA, UNSPECIFIED TYPE: ICD-10-CM

## 2025-05-27 DIAGNOSIS — M05.9 SEROPOSITIVE RHEUMATOID ARTHRITIS: ICD-10-CM

## 2025-05-27 DIAGNOSIS — M25.50 POLYARTHRALGIA: Primary | ICD-10-CM

## 2025-05-27 DIAGNOSIS — R76.8 ANA POSITIVE: ICD-10-CM

## 2025-06-11 DIAGNOSIS — F90.2 ADHD (ATTENTION DEFICIT HYPERACTIVITY DISORDER), COMBINED TYPE: ICD-10-CM

## 2025-06-11 RX ORDER — DEXTROAMPHETAMINE SACCHARATE, AMPHETAMINE ASPARTATE MONOHYDRATE, DEXTROAMPHETAMINE SULFATE AND AMPHETAMINE SULFATE 5; 5; 5; 5 MG/1; MG/1; MG/1; MG/1
20 CAPSULE, EXTENDED RELEASE ORAL EVERY MORNING
Qty: 30 CAPSULE | Refills: 0 | Status: SHIPPED | OUTPATIENT
Start: 2025-06-11

## 2025-06-18 ENCOUNTER — APPOINTMENT (OUTPATIENT)
Dept: RHEUMATOLOGY | Facility: CLINIC | Age: 50
End: 2025-06-18
Payer: COMMERCIAL

## 2025-06-27 ENCOUNTER — TELEPHONE (OUTPATIENT)
Dept: RHEUMATOLOGY | Facility: CLINIC | Age: 50
End: 2025-06-27
Payer: COMMERCIAL

## 2025-06-27 DIAGNOSIS — M05.9 SEROPOSITIVE RHEUMATOID ARTHRITIS: Primary | ICD-10-CM

## 2025-06-27 LAB
ALBUMIN SERPL-MCNC: 4.3 G/DL (ref 3.6–5.1)
ALP SERPL-CCNC: 46 U/L (ref 36–130)
ALT SERPL-CCNC: 24 U/L (ref 9–46)
ANION GAP SERPL CALCULATED.4IONS-SCNC: 8 MMOL/L (CALC) (ref 7–17)
AST SERPL-CCNC: 24 U/L (ref 10–40)
BASOPHILS # BLD AUTO: 22 CELLS/UL (ref 0–200)
BASOPHILS NFR BLD AUTO: 0.4 %
BILIRUB SERPL-MCNC: 0.9 MG/DL (ref 0.2–1.2)
BUN SERPL-MCNC: 12 MG/DL (ref 7–25)
CALCIUM SERPL-MCNC: 9.2 MG/DL (ref 8.6–10.3)
CHLORIDE SERPL-SCNC: 101 MMOL/L (ref 98–110)
CO2 SERPL-SCNC: 28 MMOL/L (ref 20–32)
CREAT SERPL-MCNC: 0.88 MG/DL (ref 0.6–1.29)
EGFRCR SERPLBLD CKD-EPI 2021: 107 ML/MIN/1.73M2
EOSINOPHIL # BLD AUTO: 103 CELLS/UL (ref 15–500)
EOSINOPHIL NFR BLD AUTO: 1.9 %
ERYTHROCYTE [DISTWIDTH] IN BLOOD BY AUTOMATED COUNT: 13.6 % (ref 11–15)
GLUCOSE SERPL-MCNC: 97 MG/DL (ref 65–99)
HCT VFR BLD AUTO: 38.8 % (ref 38.5–50)
HGB BLD-MCNC: 12.8 G/DL (ref 13.2–17.1)
LYMPHOCYTES # BLD AUTO: 1210 CELLS/UL (ref 850–3900)
LYMPHOCYTES NFR BLD AUTO: 22.4 %
MCH RBC QN AUTO: 30.6 PG (ref 27–33)
MCHC RBC AUTO-ENTMCNC: 33 G/DL (ref 32–36)
MCV RBC AUTO: 92.8 FL (ref 80–100)
MONOCYTES # BLD AUTO: 707 CELLS/UL (ref 200–950)
MONOCYTES NFR BLD AUTO: 13.1 %
NEUTROPHILS # BLD AUTO: 3359 CELLS/UL (ref 1500–7800)
NEUTROPHILS NFR BLD AUTO: 62.2 %
PLATELET # BLD AUTO: 171 THOUSAND/UL (ref 140–400)
PMV BLD REES-ECKER: 10.9 FL (ref 7.5–12.5)
POTASSIUM SERPL-SCNC: 4.5 MMOL/L (ref 3.5–5.3)
PROT SERPL-MCNC: 6.9 G/DL (ref 6.1–8.1)
RBC # BLD AUTO: 4.18 MILLION/UL (ref 4.2–5.8)
SODIUM SERPL-SCNC: 137 MMOL/L (ref 135–146)
WBC # BLD AUTO: 5.4 THOUSAND/UL (ref 3.8–10.8)

## 2025-06-27 RX ORDER — METHYLPREDNISOLONE 4 MG/1
TABLET ORAL
Qty: 21 TABLET | Refills: 0 | Status: SHIPPED | OUTPATIENT
Start: 2025-06-27

## 2025-06-27 NOTE — TELEPHONE ENCOUNTER
Patient calling in to inform Dr. Al that he competed the blood work that was ordered. Asking if he can have something for the pain in his hands now.  States they are swollen and still.  Hard to do anything that involves his hands.  Using [facility] pharmacy in [place]

## 2025-06-27 NOTE — TELEPHONE ENCOUNTER
I have never seen this patient before; I understand he used to follow with Dr Barraaz; is he asking for refills of his medications that Dr Barraza used to give him? What is he wanting for his hands? Thank you

## 2025-07-14 ASSESSMENT — RHEUMATOLOGY NEW PATIENT QUESTIONNAIRE
RASH: N
HOW WOULD YOU DESCRIBE YOUR STIFFNESS ON AVERAGE: MODERATE
MORNING STIFFNESS: Y
MUSCLE WEAKNESS: Y
MEMORY LOSS: N
UNUSUAL BLEEDING: N
FAINTING: N
DIFFICULTY STAYING ASLEEP: Y
JOINT SWELLING: Y
SEIZURES: N
SUN SENSITIVE (SUN ALLERGY): N
SKIN TIGHTNESS: N
HEADACHES: N
ANXIETY: N
JOINT PAIN: Y
EASILY LOSING TEMPER: N
NUMBNESS OR TINGLING IN HANDS OR FEET: N
DIFFICULTY FALLING ASLEEP: Y
SKIN REDNESS: Y
COLOR CHANGES OF HANDS OR FEET IN THE COLD: N
INCREASED SUSCEPTIBILITY TO INFECTION: N
BEHAVIORAL CHANGES: N
ANEMIA: N
LIST JOINTS AFFECTED BY SWELLING IN THE PAST MONTH: ALL
DEPRESSION: N
MORNING STIFFNESS IN LOWER BACK: Y
EASY BRUISING: N
SWOLLEN OR TENDER GLANDS: N
AGITATION: Y
LOSS OF CONSCIOUSNESS: N

## 2025-07-15 ENCOUNTER — APPOINTMENT (OUTPATIENT)
Dept: RHEUMATOLOGY | Facility: CLINIC | Age: 50
End: 2025-07-15
Payer: COMMERCIAL

## 2025-07-15 VITALS — HEIGHT: 72 IN | WEIGHT: 275 LBS | BODY MASS INDEX: 37.25 KG/M2

## 2025-07-15 DIAGNOSIS — R76.8 ANA POSITIVE: Primary | ICD-10-CM

## 2025-07-15 DIAGNOSIS — M05.9 SEROPOSITIVE RHEUMATOID ARTHRITIS: ICD-10-CM

## 2025-07-15 DIAGNOSIS — Z79.899 HIGH RISK MEDICATION USE: ICD-10-CM

## 2025-07-15 DIAGNOSIS — D64.9 ANEMIA, UNSPECIFIED TYPE: ICD-10-CM

## 2025-07-15 DIAGNOSIS — M25.50 POLYARTHRALGIA: ICD-10-CM

## 2025-07-15 PROCEDURE — 1036F TOBACCO NON-USER: CPT | Performed by: STUDENT IN AN ORGANIZED HEALTH CARE EDUCATION/TRAINING PROGRAM

## 2025-07-15 PROCEDURE — 3008F BODY MASS INDEX DOCD: CPT | Performed by: STUDENT IN AN ORGANIZED HEALTH CARE EDUCATION/TRAINING PROGRAM

## 2025-07-15 PROCEDURE — 99215 OFFICE O/P EST HI 40 MIN: CPT | Performed by: STUDENT IN AN ORGANIZED HEALTH CARE EDUCATION/TRAINING PROGRAM

## 2025-07-15 RX ORDER — SULFASALAZINE 500 MG/1
500 TABLET ORAL 2 TIMES DAILY
Qty: 180 TABLET | Refills: 3 | Status: SHIPPED | OUTPATIENT
Start: 2025-07-15

## 2025-07-15 RX ORDER — FOLIC ACID 1 MG/1
2 TABLET ORAL DAILY
Qty: 180 TABLET | Refills: 3 | Status: SHIPPED | OUTPATIENT
Start: 2025-07-15

## 2025-07-15 RX ORDER — ADALIMUMAB 40MG/0.4ML
40 KIT SUBCUTANEOUS
Qty: 6 EACH | Refills: 3 | Status: SHIPPED | OUTPATIENT
Start: 2025-07-15

## 2025-07-15 RX ORDER — METHOTREXATE 2.5 MG/1
TABLET ORAL
Qty: 96 TABLET | Refills: 3 | Status: SHIPPED | OUTPATIENT
Start: 2025-07-15

## 2025-07-15 NOTE — PROGRESS NOTES
Subjective   Patient ID: Andrew Valencia is a 49 y.o. male who presents for No chief complaint on file..  HPI:  Male with a history of gout tx by PCP, GERD, obesity, ADHD, depression, rheumatoid arthritis, diagnosed 2023 by Dr. Barraza, started on methotrexate and Plaquenil 2023, then adalimumab in 2024/ 2024, Plaquenil changed to sulfasalazine here to establish care as patient had plateued    Current meds: methotrexate (2023-present)  Adalimumab (approximately 2024-present)  Sulfasalazine (approximately 2024-present)    Past meds:  Plaquenil- did not work well      -/-/drinks 3-4 drinks intermittently     Works for [employer]; light physical work    Currently, stiffness, and swelling; has been off methotrexate and humira since 6/2025; ran out of sulfasalazine 7/2025    On medications, he was really good    Labs 2025:   WBC normal,  Hgb L to 12.8, platelets normal  Cr normal, ALP normal. AST/albumin/protein normal      Labs:    Serologies:   RF 61, CCP>300  Positive CHRIS but neg HAMMAD including dsdna, Sm, RNP, Sm/RNP, SSA, SSB, Scl-70, centromere, Chandni-1, chromatin, ribosomal p  Normal c3 and c4  Uric acid 8.7  Esr normal, crp normal  G6pd normal    Infectious:  2025: Hep B Core IgM neg, Hep C Ab neg, Hep B S Ag neg  2024: T spot neg  2023: Hep B Core total neg    Rheumatology specific review of systems   joint pain, all day stiffness, fevers , chills, unintentional weight loss, rashes, alopecia, mouth sores, nasal ulcers, photosensitivity, Raynauds, daily morning stiffness in back, dry eyes, dry mouth, blood clots,  dad  had RA, mom has RA, uveitis, blood or mucus in stool     Chronic pain specific review of systems  Denies widespread pain , widespread tenderness, brain fog, depression/anxiety, migraines or tension headaches, IBS or heartburn symptoms, irritable or overactive bladder, pelvic pain ,TMJ pain,poor sleep, fatigue    Objective   There were no vitals taken for this visit.      Physical Exam  Constitutional:  Alert and in no acute distress. Well developed, well nourished       Lab Results   Component Value Date    WBC 5.4 06/26/2025    HGB 12.8 (L) 06/26/2025    HCT 38.8 06/26/2025     06/26/2025    ALT 24 06/26/2025    AST 24 06/26/2025    CREATININE 0.88 06/26/2025          Lab Results   Component Value Date    ANAPATTRN Homogeneous 10/05/2023    ANATITER 1:320 10/05/2023    CHRIS Positive (A) 10/05/2023    ASSB <0.2 10/05/2023    C3 161 10/17/2023    C4 24 10/17/2023    ANTIRIBO <0.2 10/05/2023    ACEN <0.2 10/05/2023    SEDRATE 9 02/18/2025    CRP <3.0 02/18/2025    RF 61 (H) 10/05/2023   \\            There is currently no information documented on the homunculus. Go to the Rheumatology activity and complete the homunculus joint exam.      Assessment/Plan:    #Seropositive RA  #Med adverse SE? Anemia  - On methotrexate, folic acid, adalimumab, sulfasalazine  -  1 year methotrexate 8 tabs weekly refilled 7/2025  -1 year folic acid 2 tabs (going up from 1 to 2 tabs d/t anemia( daily refilled July 2025  -1 year sulfasalazine 1 tablet twice a day refilled July 2025  -1 year of Humira refilled July 2025  - Labs 2025 showing a slightly low hemoglobin at 12.8 with MCV of 93-recommend increasing folic acid to 2 mg daily, and will recheck in 6 to 8 weeks  -Will also get iron panel studies   -g6pd normal  - Risks of methotrexate discussed including but not limited to hair loss, mouth sores, liver abnormalities, GI distress, and blood cell abnormalities. Discussed that patient should hold methotrexate for 1-2 weeks after vaccinations if disease activity allows.  ACR handout given.  Patient understanding and aware of risks.  - Risks of TNF inhibitors discussed including but not limited to infection, injection site reaction, infusion reaction if using an IV form,Long-term use of TNF inhibitors may increase the risk of cancers such as lymphoma and skin cancer. There are rare neurologic complications as well. People who have  a history of multiple sclerosis should not take these medications. People with significant heart failure should not use a TNF inhibitor, because their heart disease could worsen. Patient understanding and accepting of risks.  - Risk of sulfasalazine discussed including but not limited to headache, nausea, GI side effects, sun sensitivity, orange-colored urine.  Patient counseled to use sunscreen when on sulfasalazine.    #Preventative Health Recommendations for Primary Care Providers    Vaccine Recommendations:    From ACR 2022 vaccine recommendations:    For Rheumatic and musculoskeletal disease (RMD) patients aged greater than or equal to 65 years, and RMD patients aged >18 and <65 years who are on immunosuppressive medication, giving high-dose or adjuvanted influenza vaccination is conditionally  recommended over giving regular-dose influenza vaccination.    For patients with RMD aged <65 years who are on immunosuppressive medication, pneumococcal vaccination is strongly recommended.    For patients with RMD aged >18 years who are on immunosuppressive medication, administering the recombinant zoster vaccine is strongly recommended.    For patients with RMD aged >26 and <45 years who are on immunosuppressive medication and not previously vaccinated, vaccination against HPV is conditionally recommended.    Cardiovascular Recommendations:    Patients with inflammatory diseases are at high risk for cardiovascular disease, and should be aggressively screened by primary care physicians and started on lipid-lowering medications when appropriate.    Bone Health Recommendations:    Patients on steroids should be on calcium and Vitamin D. Patients with inflammatory rheumatic diseases are higher risk for osteoporosis and should have baseline DEXA screening.     Cervical Cancer Screening Recommendations (adapted from ASCCP)  Cytology is recommended if younger than 30 years.  Co-testing is preferred, but cytology is  acceptable if 30 years or older.  If using cytology alone, perform annual cervical cytology. If results of 3 consecutive cytology results are normal, perform cytology every 3 years.  If using co-testing, perform baseline co-test with cytology and HPV. If result of cytology is normal and HPV is negative, co-testing can be performed every 3 years.  Continue screening throughout lifetime (older than 65 years). Discontinue screening based on shared discussion regarding quality and duration of life rather than age.      Patient counseled to seek medical care if any new or worsening symptoms, urgently if needed.      Note will be sent to primary care doctor.    Return to clinic in 6-8 weeks, labs before,.  sooner if needed, patient can cancel his upcoming appointment in July and reschedule for 6 to 8 weeks    Total time on this day of visit includes record and documentation review before and after visit including documentation and time not explicitly included on EMR time stamp.     Dragon dictation software was used to dictate this note. Errors may have occurred during dictation that was not intended by the user.        [id number] based off of 1 chronic illness with severe exacerbation limiting bodily function due to being off of medication since last provider left, possible medication adverse effect of bone marrow suppression requiring increased dose of folic acid, ordering and review of greater than 3 individual test, methotrexate use requiring frequent monitoring for toxicity with labs/visits

## 2025-07-21 DIAGNOSIS — F90.2 ADHD (ATTENTION DEFICIT HYPERACTIVITY DISORDER), COMBINED TYPE: ICD-10-CM

## 2025-07-21 RX ORDER — DEXTROAMPHETAMINE SACCHARATE, AMPHETAMINE ASPARTATE MONOHYDRATE, DEXTROAMPHETAMINE SULFATE AND AMPHETAMINE SULFATE 5; 5; 5; 5 MG/1; MG/1; MG/1; MG/1
20 CAPSULE, EXTENDED RELEASE ORAL EVERY MORNING
Qty: 30 CAPSULE | Refills: 0 | Status: SHIPPED | OUTPATIENT
Start: 2025-07-21

## 2025-07-24 ENCOUNTER — TELEPHONE (OUTPATIENT)
Dept: RHEUMATOLOGY | Facility: CLINIC | Age: 50
End: 2025-07-24
Payer: COMMERCIAL

## 2025-07-24 NOTE — TELEPHONE ENCOUNTER
Patient called in stating he is unable to get his Humira. They state they need a new prescription.  Using  [facility].

## 2025-07-28 ENCOUNTER — APPOINTMENT (OUTPATIENT)
Dept: RHEUMATOLOGY | Facility: CLINIC | Age: 50
End: 2025-07-28
Payer: COMMERCIAL

## 2025-07-28 DIAGNOSIS — M05.9 SEROPOSITIVE RHEUMATOID ARTHRITIS: ICD-10-CM

## 2025-07-28 RX ORDER — ADALIMUMAB 40MG/0.4ML
40 KIT SUBCUTANEOUS
Qty: 6 EACH | Refills: 3 | Status: SHIPPED | OUTPATIENT
Start: 2025-07-28

## 2025-07-28 NOTE — PROGRESS NOTES
Humira continuation of therapy, PA approved, per insurance pt must fill with [facility]. Updated rx rerouted

## 2025-08-18 DIAGNOSIS — K21.9 GASTROESOPHAGEAL REFLUX DISEASE WITHOUT ESOPHAGITIS: ICD-10-CM

## 2025-08-18 DIAGNOSIS — M1A.00X0 IDIOPATHIC CHRONIC GOUT WITHOUT TOPHUS, UNSPECIFIED SITE: ICD-10-CM

## 2025-08-18 RX ORDER — ALLOPURINOL 300 MG/1
300 TABLET ORAL DAILY
Qty: 30 TABLET | Refills: 0 | Status: SHIPPED | OUTPATIENT
Start: 2025-08-18

## 2025-08-18 RX ORDER — OMEPRAZOLE 40 MG/1
40 CAPSULE, DELAYED RELEASE ORAL DAILY
Qty: 30 CAPSULE | Refills: 0 | Status: SHIPPED | OUTPATIENT
Start: 2025-08-18

## 2025-08-21 DIAGNOSIS — F90.2 ADHD (ATTENTION DEFICIT HYPERACTIVITY DISORDER), COMBINED TYPE: ICD-10-CM

## 2025-08-21 RX ORDER — DEXTROAMPHETAMINE SACCHARATE, AMPHETAMINE ASPARTATE MONOHYDRATE, DEXTROAMPHETAMINE SULFATE AND AMPHETAMINE SULFATE 5; 5; 5; 5 MG/1; MG/1; MG/1; MG/1
20 CAPSULE, EXTENDED RELEASE ORAL EVERY MORNING
Qty: 30 CAPSULE | Refills: 0 | Status: SHIPPED | OUTPATIENT
Start: 2025-08-21

## 2025-09-08 ENCOUNTER — APPOINTMENT (OUTPATIENT)
Dept: RHEUMATOLOGY | Facility: CLINIC | Age: 50
End: 2025-09-08
Payer: COMMERCIAL

## 2025-10-29 ENCOUNTER — APPOINTMENT (OUTPATIENT)
Dept: PRIMARY CARE | Facility: CLINIC | Age: 50
End: 2025-10-29
Payer: COMMERCIAL